# Patient Record
Sex: MALE | Race: WHITE | NOT HISPANIC OR LATINO | Employment: UNEMPLOYED | URBAN - METROPOLITAN AREA
[De-identification: names, ages, dates, MRNs, and addresses within clinical notes are randomized per-mention and may not be internally consistent; named-entity substitution may affect disease eponyms.]

---

## 2021-09-16 ENCOUNTER — HOSPITAL ENCOUNTER (EMERGENCY)
Facility: HOSPITAL | Age: 31
Discharge: HOME | End: 2021-09-17
Admitting: EMERGENCY MEDICINE

## 2021-09-16 ENCOUNTER — APPOINTMENT (OUTPATIENT)
Dept: GENERAL RADIOLOGY | Facility: HOSPITAL | Age: 31
End: 2021-09-16

## 2021-09-16 VITALS
DIASTOLIC BLOOD PRESSURE: 91 MMHG | RESPIRATION RATE: 16 BRPM | BODY MASS INDEX: 25.6 KG/M2 | HEIGHT: 69 IN | SYSTOLIC BLOOD PRESSURE: 151 MMHG | OXYGEN SATURATION: 100 % | HEART RATE: 101 BPM | TEMPERATURE: 98.7 F | WEIGHT: 172.84 LBS

## 2021-09-16 DIAGNOSIS — S02.2XXA CLOSED FRACTURE OF NASAL BONE, INITIAL ENCOUNTER: ICD-10-CM

## 2021-09-16 DIAGNOSIS — H61.23 BILATERAL IMPACTED CERUMEN: Primary | ICD-10-CM

## 2021-09-16 PROCEDURE — 99283 EMERGENCY DEPT VISIT LOW MDM: CPT

## 2021-09-16 PROCEDURE — 70160 X-RAY EXAM OF NASAL BONES: CPT

## 2021-09-16 RX ADMIN — CARBAMIDE PEROXIDE 6.5% 10 DROP: 6.5 LIQUID AURICULAR (OTIC) at 21:37
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
## 2022-04-22 ENCOUNTER — OFFICE VISIT (OUTPATIENT)
Dept: INTERNAL MEDICINE | Facility: CLINIC | Age: 32
End: 2022-04-22

## 2022-04-22 ENCOUNTER — LAB (OUTPATIENT)
Dept: LAB | Facility: HOSPITAL | Age: 32
End: 2022-04-22

## 2022-04-22 VITALS
BODY MASS INDEX: 27.7 KG/M2 | WEIGHT: 187 LBS | OXYGEN SATURATION: 99 % | RESPIRATION RATE: 20 BRPM | DIASTOLIC BLOOD PRESSURE: 60 MMHG | HEART RATE: 80 BPM | SYSTOLIC BLOOD PRESSURE: 104 MMHG | HEIGHT: 69 IN | TEMPERATURE: 97.6 F

## 2022-04-22 DIAGNOSIS — F41.9 ANXIETY: ICD-10-CM

## 2022-04-22 DIAGNOSIS — F33.1 MODERATE EPISODE OF RECURRENT MAJOR DEPRESSIVE DISORDER: ICD-10-CM

## 2022-04-22 DIAGNOSIS — K40.90 LEFT GROIN HERNIA: ICD-10-CM

## 2022-04-22 DIAGNOSIS — Z23 NEED FOR TDAP VACCINATION: ICD-10-CM

## 2022-04-22 DIAGNOSIS — F33.1 MODERATE EPISODE OF RECURRENT MAJOR DEPRESSIVE DISORDER: Primary | ICD-10-CM

## 2022-04-22 DIAGNOSIS — B19.20 HEPATITIS C VIRUS INFECTION WITHOUT HEPATIC COMA, UNSPECIFIED CHRONICITY: ICD-10-CM

## 2022-04-22 DIAGNOSIS — F19.10 SUBSTANCE ABUSE: ICD-10-CM

## 2022-04-22 PROCEDURE — 90715 TDAP VACCINE 7 YRS/> IM: CPT | Performed by: NURSE PRACTITIONER

## 2022-04-22 PROCEDURE — 90471 IMMUNIZATION ADMIN: CPT | Performed by: NURSE PRACTITIONER

## 2022-04-22 PROCEDURE — 99204 OFFICE O/P NEW MOD 45 MIN: CPT | Performed by: NURSE PRACTITIONER

## 2022-04-22 RX ORDER — QUETIAPINE FUMARATE 300 MG/1
TABLET, FILM COATED ORAL
COMMUNITY
Start: 2022-03-28 | End: 2022-04-25 | Stop reason: SDUPTHER

## 2022-04-22 RX ORDER — MIRTAZAPINE 30 MG/1
TABLET, FILM COATED ORAL
COMMUNITY
Start: 2022-03-28 | End: 2022-04-22

## 2022-04-22 RX ORDER — BUSPIRONE HYDROCHLORIDE 15 MG/1
TABLET ORAL
COMMUNITY
Start: 2022-03-28 | End: 2022-04-25 | Stop reason: SDUPTHER

## 2022-04-22 RX ORDER — BUPROPION HYDROCHLORIDE 150 MG/1
TABLET, EXTENDED RELEASE ORAL
COMMUNITY
Start: 2022-03-28 | End: 2022-04-22 | Stop reason: SDUPTHER

## 2022-04-22 RX ORDER — MULTIVIT/IRON SULF/FOLIC ACID 15MG-0.4MG
TABLET ORAL
COMMUNITY
Start: 2022-03-23 | End: 2022-04-25 | Stop reason: SDUPTHER

## 2022-04-22 RX ORDER — MIRTAZAPINE 45 MG/1
45 TABLET, FILM COATED ORAL NIGHTLY
Qty: 30 TABLET | Refills: 2 | Status: SHIPPED | OUTPATIENT
Start: 2022-04-22 | End: 2022-07-22

## 2022-04-22 RX ORDER — BUPRENORPHINE 300 MG/1
SOLUTION SUBCUTANEOUS
COMMUNITY
Start: 2022-03-25 | End: 2022-06-09

## 2022-04-22 RX ORDER — BUPROPION HYDROCHLORIDE 150 MG/1
TABLET, EXTENDED RELEASE ORAL
Qty: 90 TABLET | Refills: 2 | Status: SHIPPED | OUTPATIENT
Start: 2022-04-22 | End: 2022-07-11

## 2022-04-22 NOTE — PROGRESS NOTES
Subjective   José Luis Byrd is a 32 y.o. male    Chief Complaint   Patient presents with   • Establish Care   • Hernia     Lower abdomen, 5-6 yrs, painful on and off.    • Ear Fullness     Left ear, hospital was unable to retreive ear wax, states it's in really deep   • Insomnia     Takes remeron, discuss dosage change   • Depression     Continue to prescribe all meds     History of Present Illness     New pt here to establish care    H/O substance abuse, clean since 3/18/2022    Hernia - left groin, first noted about 5-6 years ago.  Has been evaluated in the ER's, but has not had this corrected due to insurance issues.      Ear fullness; left.  Was told he had a terrible cerumen impaction.  Hospital was not able to flush    Insomnia -chronic; currently on Remeron 30 mg nightly, along with Seroquel 300 mg.  Would like to increase the Remeron if possible.    Depression -chronic; current regimen is Wellbutrin  mg p.o. twice daily.  States that this is not working as well as it previously did.  Would like to increase dose if possible.  Denies suicidal/homicidal ideations.    Hep C - dx'd about 3 years ago, no tx    Past Medical History:   Diagnosis Date   • Heart murmur    • Infectious viral hepatitis     HEP C   • Substance abuse (HCC)     IV drugs and ETOH     Past Surgical History:   Procedure Laterality Date   • HAND SURGERY Right    • KNEE SURGERY Right      Family History   Problem Relation Age of Onset   • No Known Problems Mother    • No Known Problems Father    • Depression Sister    • Diabetes Maternal Aunt    • Cancer Maternal Grandfather      Social History     Socioeconomic History   • Marital status: Legally    Tobacco Use   • Smoking status: Never Smoker   • Smokeless tobacco: Current User     Types: Snuff   Vaping Use   • Vaping Use: Some days   • Substances: Nicotine   Substance and Sexual Activity   • Alcohol use: Not Currently   • Drug use: Yes     Types: Fentanyl, Methamphetamines,  Marijuana, Heroin     Comment: sober since 3/18/22   • Sexual activity: Defer     No Known Allergies      The following portions of the patient's history were reviewed and updated as appropriate: allergies, current medications, past family history, past medical history, past social history, past surgical history and problem list.    Current Outpatient Medications:   •  buPROPion SR (WELLBUTRIN SR) 150 MG 12 hr tablet, 300 mg QAM and 150 mg QPM, Disp: 90 tablet, Rfl: 2  •  busPIRone (BUSPAR) 15 MG tablet, , Disp: , Rfl:   •  Multiple Vitamins-Iron (multivitamin with iron) tablet tablet, , Disp: , Rfl:   •  QUEtiapine (SEROquel) 300 MG tablet, , Disp: , Rfl:   •  Sublocade 300 MG/1.5ML solution prefilled syringe, , Disp: , Rfl:   •  mirtazapine (REMERON) 45 MG tablet, Take 1 tablet by mouth Every Night., Disp: 30 tablet, Rfl: 2     Review of Systems   Constitutional: Negative for chills, fatigue and fever.   Respiratory: Negative for cough, chest tightness and shortness of breath.    Cardiovascular: Negative for chest pain.   Gastrointestinal: Negative for abdominal pain, diarrhea, nausea and vomiting.   Endocrine: Negative for cold intolerance and heat intolerance.   Musculoskeletal: Negative for arthralgias.   Neurological: Negative for dizziness.   Psychiatric/Behavioral: Positive for dysphoric mood and sleep disturbance.       Objective   Physical Exam  Constitutional:       Appearance: He is well-developed.   HENT:      Head: Normocephalic and atraumatic.   Eyes:      Conjunctiva/sclera: Conjunctivae normal.      Pupils: Pupils are equal, round, and reactive to light.   Cardiovascular:      Rate and Rhythm: Normal rate and regular rhythm.      Heart sounds: Normal heart sounds.   Pulmonary:      Effort: Pulmonary effort is normal.      Breath sounds: Normal breath sounds.   Abdominal:      General: Bowel sounds are normal.      Palpations: Abdomen is soft.      Hernia: A hernia is present. Hernia is present in  "the right inguinal area.   Musculoskeletal:         General: Normal range of motion.      Cervical back: Normal range of motion.   Skin:     General: Skin is warm and dry.   Neurological:      Mental Status: He is alert and oriented to person, place, and time.      Deep Tendon Reflexes: Reflexes are normal and symmetric.   Psychiatric:         Mood and Affect: Mood is depressed.         Behavior: Behavior normal.         Thought Content: Thought content normal.         Judgment: Judgment normal.       Vitals:    04/22/22 1030   BP: 104/60   Cuff Size: Adult   Pulse: 80   Resp: 20   Temp: 97.6 °F (36.4 °C)   TempSrc: Infrared   SpO2: 99%   Weight: 84.8 kg (187 lb)   Height: 174 cm (68.5\")         Assessment/Plan   Diagnoses and all orders for this visit:    1. Moderate episode of recurrent major depressive disorder (HCC) (Primary)  -     mirtazapine (REMERON) 45 MG tablet; Take 1 tablet by mouth Every Night.  Dispense: 30 tablet; Refill: 2  -     buPROPion SR (WELLBUTRIN SR) 150 MG 12 hr tablet; 300 mg QAM and 150 mg QPM  Dispense: 90 tablet; Refill: 2  -     Basic Metabolic Panel; Future  -     Hepatic Function Panel; Future  -     Hepatitis Panel, Acute; Future  -     CBC & Differential; Future  -     TSH Rfx On Abnormal To Free T4; Future    2. Anxiety  -     Basic Metabolic Panel; Future  -     Hepatic Function Panel; Future  -     Hepatitis Panel, Acute; Future  -     CBC & Differential; Future  -     TSH Rfx On Abnormal To Free T4; Future    3. Hepatitis C virus infection without hepatic coma, unspecified chronicity  -     Basic Metabolic Panel; Future  -     Hepatic Function Panel; Future  -     Hepatitis Panel, Acute; Future  -     CBC & Differential; Future  -     TSH Rfx On Abnormal To Free T4; Future    4. Substance abuse (HCC)  -     Basic Metabolic Panel; Future  -     Hepatic Function Panel; Future  -     Hepatitis Panel, Acute; Future  -     CBC & Differential; Future  -     TSH Rfx On Abnormal To Free " T4; Future    5. Left groin hernia  -     Ambulatory Referral to General Surgery  -      Nonvascular Extremity Limited; Future    6. Need for Tdap vaccination  -     Tdap Vaccine Greater Than or Equal To 6yo IM    We will check labs, may need referral to gastroenterology  Remeron increased to 45 mg nightly  Wellbutrin increased to 300 every morning and 150 nightly  Continue BuSpar as directed  Referred to general surgery for evaluation of left groin hernia  Tdap updated  Return in about 4 weeks (around 5/20/2022) for Annual, collect labs today.

## 2022-04-25 DIAGNOSIS — F33.1 MODERATE EPISODE OF RECURRENT MAJOR DEPRESSIVE DISORDER: ICD-10-CM

## 2022-04-25 NOTE — TELEPHONE ENCOUNTER
Caller: Carson Gonzaloaida    Relationship: Self    Best call back number: [phone]    Requested Prescriptions:   Requested Prescriptions     Pending Prescriptions Disp Refills   • QUEtiapine (SEROquel) 300 MG tablet     • busPIRone (BUSPAR) 15 MG tablet     • Multiple Vitamins-Iron (multivitamin with iron) tablet tablet          Pharmacy where request should be sent: [id number] - [facility] [address] AT [facility] [address] [facility] [phone] [facility] [phone] FX     Additional details provided by patient: PATIENT STATED HIS PRESCRIPTION WERE NOT CALLED IN FOR REFILL.    PATIENT STATED BUSPAR IS 15 MG TWICE A DAY, SEROQUEL 300 MG TWICE A DAY, AND MULTI VITAMINS ONE TIME A DAY IN THE MORNING.    Does the patient have less than a 3 day supply:  [x] Yes  [] No    Doris Moreland Rep   04/25/22 12:41 EDT

## 2022-04-26 RX ORDER — MULTIVIT/IRON SULF/FOLIC ACID 15MG-0.4MG
1 TABLET ORAL DAILY
Qty: 90 EACH | Refills: 3 | Status: SHIPPED | OUTPATIENT
Start: 2022-04-26

## 2022-04-26 RX ORDER — QUETIAPINE FUMARATE 300 MG/1
300 TABLET, FILM COATED ORAL 2 TIMES DAILY
Qty: 180 TABLET | Refills: 1 | Status: SHIPPED | OUTPATIENT
Start: 2022-04-26 | End: 2022-09-09

## 2022-04-26 RX ORDER — BUSPIRONE HYDROCHLORIDE 15 MG/1
15 TABLET ORAL 2 TIMES DAILY
Qty: 180 TABLET | Refills: 1 | Status: SHIPPED | OUTPATIENT
Start: 2022-04-26 | End: 2022-05-20 | Stop reason: SDUPTHER

## 2022-04-26 NOTE — TELEPHONE ENCOUNTER
Pt called in to check on the medications again as he is out and needs them asap     Pt stated he has been taking the seroquel  300mg in the am 300 at night     buspar 15mg in am and 15mg at night     Multivitamin 1 per day

## 2022-04-26 NOTE — TELEPHONE ENCOUNTER
He dosing in not documented on his seroquel.  Can you ck with him or the pharmacy to see how he is taking this?

## 2022-04-26 NOTE — TELEPHONE ENCOUNTER
Would like a phone call back as soon as that is called in for him stated we may leave a detailed vm for him if he is unable to answer

## 2022-05-06 ENCOUNTER — LAB (OUTPATIENT)
Dept: LAB | Facility: HOSPITAL | Age: 32
End: 2022-05-06

## 2022-05-06 DIAGNOSIS — F41.9 ANXIETY: ICD-10-CM

## 2022-05-06 DIAGNOSIS — F19.10 SUBSTANCE ABUSE: ICD-10-CM

## 2022-05-06 DIAGNOSIS — F33.1 MODERATE EPISODE OF RECURRENT MAJOR DEPRESSIVE DISORDER: ICD-10-CM

## 2022-05-06 DIAGNOSIS — B19.20 HEPATITIS C VIRUS INFECTION WITHOUT HEPATIC COMA, UNSPECIFIED CHRONICITY: ICD-10-CM

## 2022-05-06 LAB
ALBUMIN SERPL-MCNC: 4.4 G/DL (ref 3.5–5.2)
ALP SERPL-CCNC: 60 U/L (ref 39–117)
ALT SERPL W P-5'-P-CCNC: 23 U/L (ref 1–41)
ANION GAP SERPL CALCULATED.3IONS-SCNC: 13.7 MMOL/L (ref 5–15)
AST SERPL-CCNC: 22 U/L (ref 1–40)
BASOPHILS # BLD AUTO: 0.08 10*3/MM3 (ref 0–0.2)
BASOPHILS NFR BLD AUTO: 1.3 % (ref 0–1.5)
BILIRUB CONJ SERPL-MCNC: <0.2 MG/DL (ref 0–0.3)
BILIRUB INDIRECT SERPL-MCNC: NORMAL MG/DL
BILIRUB SERPL-MCNC: 0.2 MG/DL (ref 0–1.2)
BUN SERPL-MCNC: 14 MG/DL (ref 6–20)
BUN/CREAT SERPL: 14.7 (ref 7–25)
CALCIUM SPEC-SCNC: 9.3 MG/DL (ref 8.6–10.5)
CHLORIDE SERPL-SCNC: 101 MMOL/L (ref 98–107)
CO2 SERPL-SCNC: 25.3 MMOL/L (ref 22–29)
CREAT SERPL-MCNC: 0.95 MG/DL (ref 0.76–1.27)
DEPRECATED RDW RBC AUTO: 46.2 FL (ref 37–54)
EGFRCR SERPLBLD CKD-EPI 2021: 109.7 ML/MIN/1.73
EOSINOPHIL # BLD AUTO: 0.76 10*3/MM3 (ref 0–0.4)
EOSINOPHIL NFR BLD AUTO: 12.4 % (ref 0.3–6.2)
ERYTHROCYTE [DISTWIDTH] IN BLOOD BY AUTOMATED COUNT: 13.8 % (ref 12.3–15.4)
GLUCOSE SERPL-MCNC: 80 MG/DL (ref 65–99)
HAV IGM SERPL QL IA: ABNORMAL
HBV CORE IGM SERPL QL IA: ABNORMAL
HBV SURFACE AG SERPL QL IA: ABNORMAL
HCT VFR BLD AUTO: 42.8 % (ref 37.5–51)
HCV AB SER DONR QL: REACTIVE
HGB BLD-MCNC: 14.4 G/DL (ref 13–17.7)
IMM GRANULOCYTES # BLD AUTO: 0.01 10*3/MM3 (ref 0–0.05)
IMM GRANULOCYTES NFR BLD AUTO: 0.2 % (ref 0–0.5)
LYMPHOCYTES # BLD AUTO: 2.96 10*3/MM3 (ref 0.7–3.1)
LYMPHOCYTES NFR BLD AUTO: 48.2 % (ref 19.6–45.3)
MCH RBC QN AUTO: 30.8 PG (ref 26.6–33)
MCHC RBC AUTO-ENTMCNC: 33.6 G/DL (ref 31.5–35.7)
MCV RBC AUTO: 91.5 FL (ref 79–97)
MONOCYTES # BLD AUTO: 0.71 10*3/MM3 (ref 0.1–0.9)
MONOCYTES NFR BLD AUTO: 11.6 % (ref 5–12)
NEUTROPHILS NFR BLD AUTO: 1.62 10*3/MM3 (ref 1.7–7)
NEUTROPHILS NFR BLD AUTO: 26.3 % (ref 42.7–76)
NRBC BLD AUTO-RTO: 0 /100 WBC (ref 0–0.2)
PLATELET # BLD AUTO: 242 10*3/MM3 (ref 140–450)
PMV BLD AUTO: 11.4 FL (ref 6–12)
POTASSIUM SERPL-SCNC: 4 MMOL/L (ref 3.5–5.2)
PROT SERPL-MCNC: 7.4 G/DL (ref 6–8.5)
RBC # BLD AUTO: 4.68 10*6/MM3 (ref 4.14–5.8)
SODIUM SERPL-SCNC: 140 MMOL/L (ref 136–145)
TSH SERPL DL<=0.05 MIU/L-ACNC: 3.22 UIU/ML (ref 0.27–4.2)
WBC NRBC COR # BLD: 6.14 10*3/MM3 (ref 3.4–10.8)

## 2022-05-06 PROCEDURE — 80048 BASIC METABOLIC PNL TOTAL CA: CPT | Performed by: NURSE PRACTITIONER

## 2022-05-06 PROCEDURE — 85025 COMPLETE CBC W/AUTO DIFF WBC: CPT | Performed by: NURSE PRACTITIONER

## 2022-05-06 PROCEDURE — 84443 ASSAY THYROID STIM HORMONE: CPT | Performed by: NURSE PRACTITIONER

## 2022-05-06 PROCEDURE — 80074 ACUTE HEPATITIS PANEL: CPT | Performed by: NURSE PRACTITIONER

## 2022-05-06 PROCEDURE — 36415 COLL VENOUS BLD VENIPUNCTURE: CPT

## 2022-05-06 PROCEDURE — 80076 HEPATIC FUNCTION PANEL: CPT | Performed by: NURSE PRACTITIONER

## 2022-05-10 ENCOUNTER — TELEPHONE (OUTPATIENT)
Dept: INTERNAL MEDICINE | Facility: CLINIC | Age: 32
End: 2022-05-10

## 2022-05-14 ENCOUNTER — HOSPITAL ENCOUNTER (OUTPATIENT)
Dept: ULTRASOUND IMAGING | Facility: HOSPITAL | Age: 32
Discharge: HOME | End: 2022-05-14
Admitting: NURSE PRACTITIONER

## 2022-05-14 DIAGNOSIS — K40.90 LEFT GROIN HERNIA: ICD-10-CM

## 2022-05-14 PROCEDURE — 76705 ECHO EXAM OF ABDOMEN: CPT

## 2022-05-16 DIAGNOSIS — B19.20 HEPATITIS C TEST POSITIVE: Primary | ICD-10-CM

## 2022-05-16 NOTE — TELEPHONE ENCOUNTER
Please let patient know that his hep C test was positive.  I have referred him to gastroenterology.  His CBC was also slightly abnormal.  We will just recheck at his next visit with me.  All other labs within acceptable limits.

## 2022-05-20 ENCOUNTER — OFFICE VISIT (OUTPATIENT)
Dept: INTERNAL MEDICINE | Facility: CLINIC | Age: 32
End: 2022-05-20

## 2022-05-20 ENCOUNTER — LAB (OUTPATIENT)
Dept: LAB | Facility: HOSPITAL | Age: 32
End: 2022-05-20

## 2022-05-20 VITALS
DIASTOLIC BLOOD PRESSURE: 70 MMHG | OXYGEN SATURATION: 97 % | SYSTOLIC BLOOD PRESSURE: 108 MMHG | HEIGHT: 69 IN | HEART RATE: 79 BPM | RESPIRATION RATE: 18 BRPM | TEMPERATURE: 97 F | WEIGHT: 184 LBS | BODY MASS INDEX: 27.25 KG/M2

## 2022-05-20 DIAGNOSIS — B19.20 HEPATITIS C VIRUS INFECTION WITHOUT HEPATIC COMA, UNSPECIFIED CHRONICITY: ICD-10-CM

## 2022-05-20 DIAGNOSIS — E55.9 VITAMIN D DEFICIENCY: ICD-10-CM

## 2022-05-20 DIAGNOSIS — F33.1 MODERATE EPISODE OF RECURRENT MAJOR DEPRESSIVE DISORDER: ICD-10-CM

## 2022-05-20 DIAGNOSIS — K40.90 LEFT INGUINAL HERNIA: ICD-10-CM

## 2022-05-20 DIAGNOSIS — Z00.00 ANNUAL PHYSICAL EXAM: ICD-10-CM

## 2022-05-20 DIAGNOSIS — Z00.00 ANNUAL PHYSICAL EXAM: Primary | ICD-10-CM

## 2022-05-20 DIAGNOSIS — F41.9 ANXIETY: ICD-10-CM

## 2022-05-20 DIAGNOSIS — R79.89 ABNORMAL CBC: ICD-10-CM

## 2022-05-20 LAB
25(OH)D3 SERPL-MCNC: 21.2 NG/ML (ref 30–100)
BASOPHILS # BLD AUTO: 0.05 10*3/MM3 (ref 0–0.2)
BASOPHILS NFR BLD AUTO: 1 % (ref 0–1.5)
BILIRUB BLD-MCNC: NEGATIVE MG/DL
CHOLEST SERPL-MCNC: 150 MG/DL (ref 0–200)
CLARITY, POC: CLEAR
COLOR UR: YELLOW
DEPRECATED RDW RBC AUTO: 43.3 FL (ref 37–54)
EOSINOPHIL # BLD AUTO: 0.85 10*3/MM3 (ref 0–0.4)
EOSINOPHIL NFR BLD AUTO: 17.2 % (ref 0.3–6.2)
ERYTHROCYTE [DISTWIDTH] IN BLOOD BY AUTOMATED COUNT: 13.3 % (ref 12.3–15.4)
EXPIRATION DATE: NORMAL
FERRITIN SERPL-MCNC: 62.8 NG/ML (ref 30–400)
FOLATE SERPL-MCNC: >20 NG/ML (ref 4.78–24.2)
GLUCOSE UR STRIP-MCNC: NEGATIVE MG/DL
HCT VFR BLD AUTO: 41.7 % (ref 37.5–51)
HGB BLD-MCNC: 14.4 G/DL (ref 13–17.7)
IMM GRANULOCYTES # BLD AUTO: 0.01 10*3/MM3 (ref 0–0.05)
IMM GRANULOCYTES NFR BLD AUTO: 0.2 % (ref 0–0.5)
IRON 24H UR-MRATE: 121 MCG/DL (ref 59–158)
IRON SATN MFR SERPL: 31 % (ref 20–50)
KETONES UR QL: NEGATIVE
LEUKOCYTE EST, POC: NEGATIVE
LYMPHOCYTES # BLD AUTO: 1.91 10*3/MM3 (ref 0.7–3.1)
LYMPHOCYTES NFR BLD AUTO: 38.7 % (ref 19.6–45.3)
Lab: NORMAL
MCH RBC QN AUTO: 31.2 PG (ref 26.6–33)
MCHC RBC AUTO-ENTMCNC: 34.5 G/DL (ref 31.5–35.7)
MCV RBC AUTO: 90.5 FL (ref 79–97)
MONOCYTES # BLD AUTO: 0.57 10*3/MM3 (ref 0.1–0.9)
MONOCYTES NFR BLD AUTO: 11.6 % (ref 5–12)
NEUTROPHILS NFR BLD AUTO: 1.54 10*3/MM3 (ref 1.7–7)
NEUTROPHILS NFR BLD AUTO: 31.3 % (ref 42.7–76)
NITRITE UR-MCNC: NEGATIVE MG/ML
NRBC BLD AUTO-RTO: 0 /100 WBC (ref 0–0.2)
PH UR: 6.5 [PH] (ref 5–8)
PLATELET # BLD AUTO: 250 10*3/MM3 (ref 140–450)
PMV BLD AUTO: 11 FL (ref 6–12)
PROT UR STRIP-MCNC: NEGATIVE MG/DL
RBC # BLD AUTO: 4.61 10*6/MM3 (ref 4.14–5.8)
RBC # UR STRIP: NEGATIVE /UL
SP GR UR: 1.01 (ref 1–1.03)
TIBC SERPL-MCNC: 392 MCG/DL (ref 298–536)
TRANSFERRIN SERPL-MCNC: 263 MG/DL (ref 200–360)
TSH SERPL DL<=0.05 MIU/L-ACNC: 1.95 UIU/ML (ref 0.27–4.2)
UROBILINOGEN UR QL: NORMAL
VIT B12 BLD-MCNC: 468 PG/ML (ref 211–946)
WBC NRBC COR # BLD: 4.93 10*3/MM3 (ref 3.4–10.8)

## 2022-05-20 PROCEDURE — 82306 VITAMIN D 25 HYDROXY: CPT | Performed by: NURSE PRACTITIONER

## 2022-05-20 PROCEDURE — 82746 ASSAY OF FOLIC ACID SERUM: CPT | Performed by: NURSE PRACTITIONER

## 2022-05-20 PROCEDURE — 82607 VITAMIN B-12: CPT | Performed by: NURSE PRACTITIONER

## 2022-05-20 PROCEDURE — 84443 ASSAY THYROID STIM HORMONE: CPT | Performed by: NURSE PRACTITIONER

## 2022-05-20 PROCEDURE — 2014F MENTAL STATUS ASSESS: CPT | Performed by: NURSE PRACTITIONER

## 2022-05-20 PROCEDURE — 81003 URINALYSIS AUTO W/O SCOPE: CPT | Performed by: NURSE PRACTITIONER

## 2022-05-20 PROCEDURE — 3008F BODY MASS INDEX DOCD: CPT | Performed by: NURSE PRACTITIONER

## 2022-05-20 PROCEDURE — 85025 COMPLETE CBC W/AUTO DIFF WBC: CPT | Performed by: NURSE PRACTITIONER

## 2022-05-20 PROCEDURE — 82270 OCCULT BLOOD FECES: CPT | Performed by: NURSE PRACTITIONER

## 2022-05-20 PROCEDURE — 83540 ASSAY OF IRON: CPT | Performed by: NURSE PRACTITIONER

## 2022-05-20 PROCEDURE — 84466 ASSAY OF TRANSFERRIN: CPT | Performed by: NURSE PRACTITIONER

## 2022-05-20 PROCEDURE — 99395 PREV VISIT EST AGE 18-39: CPT | Performed by: NURSE PRACTITIONER

## 2022-05-20 PROCEDURE — 82728 ASSAY OF FERRITIN: CPT | Performed by: NURSE PRACTITIONER

## 2022-05-20 PROCEDURE — 82465 ASSAY BLD/SERUM CHOLESTEROL: CPT | Performed by: NURSE PRACTITIONER

## 2022-05-20 RX ORDER — BUSPIRONE HYDROCHLORIDE 15 MG/1
15 TABLET ORAL 3 TIMES DAILY
Qty: 270 TABLET | Refills: 1 | Status: SHIPPED | OUTPATIENT
Start: 2022-05-20 | End: 2023-01-19

## 2022-05-20 NOTE — PROGRESS NOTES
Subjective   José Luis Byrd is a 32 y.o. male    Chief Complaint   Patient presents with   • Annual Exam   • Anxiety     Pt asking if he could take 3 tablets of the buspar a day, doing good with 2 but sometimes his anxiety level gets too high sometimes and wanted to know if he could increase     History of Present Illness     H/O substance abuse, clean since 3/18/2022.  He is living in a sober living home     Hernia - left groin, first noted about 5-6 years ago.  Has been evaluated in the ER's, but has not had this corrected due to insurance issues.  Was sent for US at last visit  IMPRESSION:  1.  Slight heterogeneity and irregularity within the soft tissues in the  left suprapubic area where patient is symptomatic. Findings may relate  to hernia. CT could be of benefit to further assess the anatomy.     Insomnia -chronic; currently on Remeron 45 mg nightly (increased at last visit), along with Seroquel 300 mg.       Depression/anxiety -chronic; current regimen is Wellbutrin  QAM and 150 mg p.o. QHS.  Also taking Buspar 15 mg BID.  Feeling more anxious and would like to increase his Buspar to TID dosing     Hep C - dx'd about 3 years ago, no tx.  Has been referred to GI.      COVID - declines  Flu shot - declines  Tdap - 4/22/22  Hep C screen - 4/2022    Diet - could use work on healthier choices    Exercise - not currently     Social History     Tobacco Use   • Smoking status: Never Smoker   • Smokeless tobacco: Current User     Types: Snuff   Vaping Use   • Vaping Use: Some days   • Substances: Nicotine   Substance Use Topics   • Alcohol use: Not Currently   • Drug use: Yes     Types: Fentanyl, Methamphetamines, Marijuana, Heroin     Comment: sober since 3/18/22       The following portions of the patient's history were reviewed and updated as appropriate: allergies, current medications, past family history, past medical history, past social history, past surgical history and problem list.    Current  Outpatient Medications:   •  buPROPion SR (WELLBUTRIN SR) 150 MG 12 hr tablet, 300 mg QAM and 150 mg QPM, Disp: 90 tablet, Rfl: 2  •  busPIRone (BUSPAR) 15 MG tablet, Take 1 tablet by mouth 3 (Three) Times a Day., Disp: 270 tablet, Rfl: 1  •  mirtazapine (REMERON) 45 MG tablet, Take 1 tablet by mouth Every Night., Disp: 30 tablet, Rfl: 2  •  Multiple Vitamins-Iron (multivitamin with iron) tablet tablet, Take 1 tablet by mouth Daily., Disp: 90 each, Rfl: 3  •  ondansetron ODT (ZOFRAN-ODT) 4 MG disintegrating tablet, Place 1 tablet on the tongue Every 8 (Eight) Hours As Needed for Nausea., Disp: 20 tablet, Rfl: 0  •  QUEtiapine (SEROquel) 300 MG tablet, Take 1 tablet by mouth 2 (Two) Times a Day., Disp: 180 tablet, Rfl: 1  •  Sublocade 300 MG/1.5ML solution prefilled syringe, , Disp: , Rfl:      Review of Systems   Constitutional: Negative for appetite change, chills, fatigue, fever and unexpected weight change.   HENT: Negative for congestion, ear pain, nosebleeds, rhinorrhea and tinnitus.    Eyes: Negative for pain.   Respiratory: Negative for cough, chest tightness and shortness of breath.    Cardiovascular: Negative for chest pain, palpitations and leg swelling.   Gastrointestinal: Negative for abdominal distention, abdominal pain, blood in stool, constipation, diarrhea, nausea and vomiting.   Endocrine: Negative for cold intolerance, heat intolerance, polydipsia, polyphagia and polyuria.   Genitourinary: Negative for dysuria, flank pain, frequency, hematuria and urgency.   Musculoskeletal: Negative for arthralgias, back pain, gait problem, joint swelling, myalgias and neck pain.   Skin: Negative for color change, pallor, rash and wound.   Allergic/Immunologic: Negative for environmental allergies and food allergies.   Neurological: Negative for dizziness, syncope, weakness, light-headedness, numbness and headaches.   Hematological: Negative for adenopathy. Does not bruise/bleed easily.   Psychiatric/Behavioral:  "Negative for behavioral problems and suicidal ideas. The patient is nervous/anxious.        Objective   Physical Exam  Constitutional:       Appearance: He is well-developed and normal weight.   HENT:      Head: Normocephalic and atraumatic.      Right Ear: External ear normal.      Left Ear: External ear normal.      Nose: Nose normal.      Mouth/Throat:      Mouth: Mucous membranes are moist.      Pharynx: Oropharynx is clear.   Eyes:      General: Lids are normal.      Conjunctiva/sclera: Conjunctivae normal.      Pupils: Pupils are equal, round, and reactive to light.   Neck:      Vascular: No carotid bruit.   Cardiovascular:      Rate and Rhythm: Normal rate and regular rhythm.      Pulses: Normal pulses.      Heart sounds: Normal heart sounds. No murmur heard.  Pulmonary:      Effort: Pulmonary effort is normal.      Breath sounds: Normal breath sounds.   Abdominal:      General: Bowel sounds are normal.      Palpations: Abdomen is soft. There is no hepatomegaly or splenomegaly.      Hernia: No hernia is present.   Genitourinary:     Penis: Normal.       Prostate: Normal.      Rectum: Normal. Guaiac result negative.   Musculoskeletal:         General: Normal range of motion.      Cervical back: Normal range of motion and neck supple.   Lymphadenopathy:      Cervical: No cervical adenopathy.   Skin:     General: Skin is warm and dry.      Capillary Refill: Capillary refill takes less than 2 seconds.   Neurological:      Mental Status: He is alert and oriented to person, place, and time.      Deep Tendon Reflexes: Reflexes are normal and symmetric.   Psychiatric:         Mood and Affect: Mood is anxious.         Behavior: Behavior normal.         Thought Content: Thought content normal.         Judgment: Judgment normal.       Vitals:    05/20/22 1026   BP: 108/70   Cuff Size: Adult   Pulse: 79   Resp: 18   Temp: 97 °F (36.1 °C)   TempSrc: Infrared   SpO2: 97%   Weight: 83.5 kg (184 lb)   Height: 174 cm (68.5\") "         Assessment & Plan   Diagnoses and all orders for this visit:    1. Annual physical exam (Primary)  -     POCT urinalysis dipstick, automated  -     TSH Rfx On Abnormal To Free T4; Future  -     Vitamin B12; Future  -     Vitamin D 25 Hydroxy; Future  -     CBC & Differential; Future  -     Iron Profile; Future  -     Ferritin; Future  -     Folate; Future  -     Cholesterol, Total; Future    2. Left inguinal hernia  -     Ambulatory Referral to General Surgery    3. Hepatitis C virus infection without hepatic coma, unspecified chronicity  -     TSH Rfx On Abnormal To Free T4; Future  -     Vitamin B12; Future  -     Vitamin D 25 Hydroxy; Future  -     CBC & Differential; Future  -     Iron Profile; Future  -     Ferritin; Future  -     Folate; Future  -     Cholesterol, Total; Future    4. Anxiety  -     busPIRone (BUSPAR) 15 MG tablet; Take 1 tablet by mouth 3 (Three) Times a Day.  Dispense: 270 tablet; Refill: 1  -     TSH Rfx On Abnormal To Free T4; Future  -     Vitamin B12; Future  -     Vitamin D 25 Hydroxy; Future  -     CBC & Differential; Future  -     Iron Profile; Future  -     Ferritin; Future  -     Folate; Future  -     Cholesterol, Total; Future    5. Moderate episode of recurrent major depressive disorder (HCC)  -     TSH Rfx On Abnormal To Free T4; Future  -     Vitamin B12; Future  -     Vitamin D 25 Hydroxy; Future  -     CBC & Differential; Future  -     Iron Profile; Future  -     Ferritin; Future  -     Folate; Future  -     Cholesterol, Total; Future    6. Abnormal CBC  -     TSH Rfx On Abnormal To Free T4; Future  -     Vitamin B12; Future  -     Vitamin D 25 Hydroxy; Future  -     CBC & Differential; Future  -     Iron Profile; Future  -     Ferritin; Future  -     Folate; Future  -     Cholesterol, Total; Future    7. Vitamin D deficiency  -     TSH Rfx On Abnormal To Free T4; Future  -     Vitamin B12; Future  -     Vitamin D 25 Hydroxy; Future  -     CBC & Differential; Future  -      Iron Profile; Future  -     Ferritin; Future  -     Folate; Future  -     Cholesterol, Total; Future    Labs sent  BuSpar increased to 3 times daily dosing  No other medication changes made today  No refills needed today  Counseling, diet and exercise, staying sober  Referred to general surgery for evaluation of left groin hernia  Keep appointment as scheduled with gastroenterology for evaluation of hep C  Return in about 6 months (around 11/20/2022) for f/u, collect labs today.

## 2022-06-08 NOTE — PROGRESS NOTES
Patient: José Luis Byrd    YOB: 1990    Date: 06/09/2022    Primary Care Provider: Elba Ricks APRN    Chief Complaint   Patient presents with   • Mass     LIH       SUBJECTIVE:    History of present illness: Patient with a 5 to 6-year history of left inguinal bulge.  He states that standing makes it worse.  Some localized pain.  No urinary or bowel issues.    The following portions of the patient's history were reviewed and updated as appropriate: allergies, current medications, past family history, past medical history, past social history, past surgical history and problem list.    Review of Systems   Constitutional: Negative for activity change, chills, fever and unexpected weight change.   HENT: Negative for hearing loss, trouble swallowing and voice change.    Eyes: Negative for visual disturbance.   Respiratory: Negative for apnea, cough, chest tightness, shortness of breath and wheezing.    Cardiovascular: Negative for chest pain, palpitations and leg swelling.   Gastrointestinal: Positive for abdominal distention and abdominal pain. Negative for anal bleeding, blood in stool, constipation, diarrhea, nausea, rectal pain and vomiting.   Endocrine: Negative for cold intolerance and heat intolerance.   Genitourinary: Negative for difficulty urinating, dysuria and flank pain.   Musculoskeletal: Negative for back pain and gait problem.   Skin: Negative for color change, rash and wound.   Neurological: Negative for dizziness, syncope, speech difficulty, weakness, light-headedness, numbness and headaches.   Hematological: Negative for adenopathy. Does not bruise/bleed easily.   Psychiatric/Behavioral: Negative for confusion. The patient is not nervous/anxious.        History:  Past Medical History:   Diagnosis Date   • Heart murmur    • Infectious viral hepatitis     HEP C   • Substance abuse (HCC)     IV drugs and ETOH       Past Surgical History:   Procedure Laterality Date   • HAND  "SURGERY Right    • KNEE SURGERY Right        Family History   Problem Relation Age of Onset   • No Known Problems Mother    • No Known Problems Father    • Depression Sister    • Diabetes Maternal Aunt    • Cancer Maternal Grandfather        Social History     Tobacco Use   • Smoking status: Never Smoker   • Smokeless tobacco: Current User     Types: Snuff   Vaping Use   • Vaping Use: Some days   • Substances: Nicotine   Substance Use Topics   • Alcohol use: Not Currently   • Drug use: Yes     Types: Fentanyl, Methamphetamines, Marijuana, Heroin     Comment: sober since 3/18/22       Allergies:  No Known Allergies    Medications:    Current Outpatient Medications:   •  buPROPion SR (WELLBUTRIN SR) 150 MG 12 hr tablet, 300 mg QAM and 150 mg QPM, Disp: 90 tablet, Rfl: 2  •  busPIRone (BUSPAR) 15 MG tablet, Take 1 tablet by mouth 3 (Three) Times a Day., Disp: 270 tablet, Rfl: 1  •  mirtazapine (REMERON) 45 MG tablet, Take 1 tablet by mouth Every Night., Disp: 30 tablet, Rfl: 2  •  Multiple Vitamins-Iron (multivitamin with iron) tablet tablet, Take 1 tablet by mouth Daily., Disp: 90 each, Rfl: 3  •  multivitamin with minerals tablet tablet, Take 1 tablet by mouth Daily., Disp: , Rfl:   •  QUEtiapine (SEROquel) 300 MG tablet, Take 1 tablet by mouth 2 (Two) Times a Day., Disp: 180 tablet, Rfl: 1  •  Sublocade 100 MG/0.5ML solution prefilled syringe, , Disp: , Rfl:     OBJECTIVE:    Vital Signs:   Vitals:    06/09/22 1349   BP: 130/78   Pulse: 94   Temp: 98 °F (36.7 °C)   TempSrc: Temporal   SpO2: 99%   Weight: 87.2 kg (192 lb 3.2 oz)   Height: 175.3 cm (69\")       Physical Exam:   General Appearance:    Alert, cooperative, in no acute distress   Head:    Normocephalic, without obvious abnormality, atraumatic   Eyes:            Normal.  No scleral icterus.  PERRLA    Lungs:     Clear to auscultation,respirations regular, even and                  unlabored    Heart:    Regular rhythm and normal rate, normal S1 and S2, no   "          murmur   Abdomen:     Normal bowel sounds, no masses, no organomegaly, soft        non-tender, non-distended, no guarding, reducible left inguinal hernia.  No hernia on the right side.   Extremities:   Moves all extremities well, no edema, no cyanosis, no             redness   Skin:   No bleeding, bruising or rash   Neurologic:   Normal without gross deficits.   Psychiatric: No evidence of depression or anxiety          Results Review:   None    Review of Systems was reviewed and confirmed as accurate as documented by the MA.    ASSESSMENT/PLAN:    1. Non-recurrent unilateral inguinal hernia without obstruction or gangrene        Patient with a left inguinal hernia that is increasing in size.  I explained to him that he has the option of repair as well as observation.  With observation this will continue to worsen with time and is at risk for intestinal strangulation/obstruction.  I recommend a left inguinal hernia repair.  I had a detailed and extensive discussion with the patient in the office and they understand that they need to undergo hernia repair with mesh.  Full risks and benefits of operative versus nonoperative intervention were discussed with the patient and these included things such as nonresolution of symptoms and possible worsening of symptoms without surgical intervention versus infection, bleeding, possible recurrent hernia, possible postoperative neuralgia from nerve damage or involvement with scar tissue, etc. he understands all of the above and wishes to have his hernia repaired         Electronically signed by Edison Aj MD  06/09/22

## 2022-06-09 ENCOUNTER — OFFICE VISIT (OUTPATIENT)
Dept: SURGERY | Facility: CLINIC | Age: 32
End: 2022-06-09

## 2022-06-09 VITALS
HEART RATE: 94 BPM | BODY MASS INDEX: 28.47 KG/M2 | OXYGEN SATURATION: 99 % | TEMPERATURE: 98 F | WEIGHT: 192.2 LBS | DIASTOLIC BLOOD PRESSURE: 78 MMHG | HEIGHT: 69 IN | SYSTOLIC BLOOD PRESSURE: 130 MMHG

## 2022-06-09 DIAGNOSIS — K40.90 NON-RECURRENT UNILATERAL INGUINAL HERNIA WITHOUT OBSTRUCTION OR GANGRENE: Primary | ICD-10-CM

## 2022-06-09 PROCEDURE — 99204 OFFICE O/P NEW MOD 45 MIN: CPT | Performed by: SURGERY

## 2022-06-09 RX ORDER — BUPRENORPHINE 100 MG/1
SOLUTION SUBCUTANEOUS
COMMUNITY
Start: 2022-05-20 | End: 2022-07-12

## 2022-06-09 RX ORDER — MULTIVITAMIN WITH FOLIC ACID 400 MCG
1 TABLET ORAL DAILY
COMMUNITY
Start: 2022-05-25 | End: 2022-06-27

## 2022-06-10 ENCOUNTER — PATIENT ROUNDING (BHMG ONLY) (OUTPATIENT)
Dept: SURGERY | Facility: CLINIC | Age: 32
End: 2022-06-10

## 2022-06-10 NOTE — PROGRESS NOTES
June 09, 2022    Hello, may I speak with José Luis Byrd?    My name is ELVA HARRISON     I am  with [facility] GENERAL SURGERY  [address]  [place] [address] [phone].    Before we get started may I verify your date of birth? 1990    I am calling to officially welcome you to our practice and ask about your recent visit. Is this a good time to talk? yes    Tell me about your visit with us. What things went well?  EVERYTHING WAS GOOD.       We're always looking for ways to make our patients' experiences even better. Do you have recommendations on ways we may improve?  no    Overall were you satisfied with your first visit to our practice? yes       I appreciate you taking the time to speak with me today. Is there anything else I can do for you? no      Thank you, and have a great day.

## 2022-06-14 DIAGNOSIS — Z01.818 PREOPERATIVE CLEARANCE: Primary | ICD-10-CM

## 2022-06-24 ENCOUNTER — TELEPHONE (OUTPATIENT)
Dept: SURGERY | Facility: CLINIC | Age: 32
End: 2022-06-24

## 2022-06-24 ENCOUNTER — TELEPHONE (OUTPATIENT)
Dept: PREADMISSION TESTING | Facility: HOSPITAL | Age: 32
End: 2022-06-24

## 2022-06-24 NOTE — TELEPHONE ENCOUNTER
M FOR PATIENT TO CALL AND CONFIRM THE PROCEDURE ON 06/29/2022 @ [facility].

## 2022-06-27 ENCOUNTER — PRE-ADMISSION TESTING (OUTPATIENT)
Dept: PREADMISSION TESTING | Facility: HOSPITAL | Age: 32
End: 2022-06-27

## 2022-06-27 VITALS — BODY MASS INDEX: 27.99 KG/M2 | HEIGHT: 69 IN | WEIGHT: 189 LBS

## 2022-06-27 DIAGNOSIS — R79.89 ABNORMAL LFTS: Primary | ICD-10-CM

## 2022-06-27 DIAGNOSIS — Z01.818 PREOP TESTING: Primary | ICD-10-CM

## 2022-06-27 LAB
ALBUMIN SERPL-MCNC: 5 G/DL (ref 3.5–5.2)
ALBUMIN/GLOB SERPL: 1.7 G/DL
ALP SERPL-CCNC: 91 U/L (ref 39–117)
ALT SERPL W P-5'-P-CCNC: 109 U/L (ref 1–41)
ANION GAP SERPL CALCULATED.3IONS-SCNC: 13.8 MMOL/L (ref 5–15)
AST SERPL-CCNC: 53 U/L (ref 1–40)
BILIRUB SERPL-MCNC: 0.3 MG/DL (ref 0–1.2)
BUN SERPL-MCNC: 17 MG/DL (ref 6–20)
BUN/CREAT SERPL: 17.7 (ref 7–25)
CALCIUM SPEC-SCNC: 9.3 MG/DL (ref 8.6–10.5)
CHLORIDE SERPL-SCNC: 100 MMOL/L (ref 98–107)
CO2 SERPL-SCNC: 24.2 MMOL/L (ref 22–29)
CREAT SERPL-MCNC: 0.96 MG/DL (ref 0.76–1.27)
DEPRECATED RDW RBC AUTO: 39.3 FL (ref 37–54)
EGFRCR SERPLBLD CKD-EPI 2021: 108.4 ML/MIN/1.73
ERYTHROCYTE [DISTWIDTH] IN BLOOD BY AUTOMATED COUNT: 12 % (ref 12.3–15.4)
GLOBULIN UR ELPH-MCNC: 2.9 GM/DL
GLUCOSE SERPL-MCNC: 89 MG/DL (ref 65–99)
HCT VFR BLD AUTO: 44.8 % (ref 37.5–51)
HGB BLD-MCNC: 15.9 G/DL (ref 13–17.7)
MCH RBC QN AUTO: 31.4 PG (ref 26.6–33)
MCHC RBC AUTO-ENTMCNC: 35.5 G/DL (ref 31.5–35.7)
MCV RBC AUTO: 88.5 FL (ref 79–97)
PLATELET # BLD AUTO: 266 10*3/MM3 (ref 140–450)
PMV BLD AUTO: 10.6 FL (ref 6–12)
POTASSIUM SERPL-SCNC: 4.4 MMOL/L (ref 3.5–5.2)
PROT SERPL-MCNC: 7.9 G/DL (ref 6–8.5)
RBC # BLD AUTO: 5.06 10*6/MM3 (ref 4.14–5.8)
SODIUM SERPL-SCNC: 138 MMOL/L (ref 136–145)
WBC NRBC COR # BLD: 5.98 10*3/MM3 (ref 3.4–10.8)

## 2022-06-27 PROCEDURE — 80053 COMPREHEN METABOLIC PANEL: CPT

## 2022-06-27 PROCEDURE — U0004 COV-19 TEST NON-CDC HGH THRU: HCPCS

## 2022-06-27 PROCEDURE — C9803 HOPD COVID-19 SPEC COLLECT: HCPCS

## 2022-06-27 PROCEDURE — 85027 COMPLETE CBC AUTOMATED: CPT

## 2022-06-27 PROCEDURE — 36415 COLL VENOUS BLD VENIPUNCTURE: CPT

## 2022-06-27 NOTE — DISCHARGE INSTRUCTIONS
PAT PASS GIVEN/REVIEWED WITH PT.  VERBALIZED UNDERSTANDING OF THE FOLLOWING:  DO NOT EAT, DRINK, SMOKE, USE SMOKELESS TOBACCO OR CHEW GUM AFTER MIDNIGHT THE NIGHT BEFORE SURGERY.  THIS ALSO INCLUDES HARD CANDIES AND MINTS.    DO NOT SHAVE THE AREA TO BE OPERATED ON AT LEAST 48 HOURS PRIOR TO THE PROCEDURE.  DO NOT WEAR MAKE UP OR NAIL POLISH.  DO NOT LEAVE IN ANY PIERCING OR WEAR JEWELRY THE DAY OF SURGERY.      DO NOT USE ADHESIVES IF YOU WEAR DENTURES.    DO NOT WEAR EYE CONTACTS; BRING IN YOUR GLASSES.    ONLY TAKE MEDICATION THE MORNING OF YOUR PROCEDURE IF INSTRUCTED BY YOUR SURGEON WITH ENOUGH WATER TO SWALLOW THE MEDICATION.  IF YOUR SURGEON DID NOT SPECIFY WHICH MEDICATIONS TO TAKE, YOU WILL NEED TO CALL THEIR OFFICE FOR FURTHER INSTRUCTIONS AND DO AS THEY INSTRUCT.    LEAVE ANYTHING YOU CONSIDER VALUABLE AT HOME.    YOU WILL NEED TO ARRANGE FOR SOMEONE TO DRIVE YOU HOME AFTER SURGERY.  IT IS RECOMMENDED THAT YOU DO NOT DRIVE, WORK, DRINK ALCOHOL OR MAKE MAJOR DECISIONS FOR AT LEAST 24 HOURS AFTER YOUR PROCEDURE IS COMPLETE.      THE DAY OF YOUR PROCEDURE, BRING IN THE FOLLOWING IF APPLICABLE:   PICTURE ID AND INSURANCE/MEDICARE OR MEDICAID CARDS/ANY CO-PAY THAT MAY BE DUE   COPY OF ADVANCED DIRECTIVE/LIVING WILL/POWER OR    CPAP/BIPAP/INHALERS   SKIN PREP SHEET   YOUR PREADMISSION TESTING PASS (IF NOT A PHONE HISTORY)       Chlorhexidine wipes along with instruction/verification sheet given to pt.  Instructed pt to date, time, and initial the verification sheet once skin prep has been  completed, and to [place] [facility] the day of the procedure.  Pt. Verbalizes understanding.       COVID self-quarantine instructions reviewed with the pt.  Verbalized understanding.

## 2022-06-28 LAB — SARS-COV-2 RNA NOSE QL NAA+PROBE: NOT DETECTED

## 2022-06-29 ENCOUNTER — ANESTHESIA EVENT (OUTPATIENT)
Dept: PERIOP | Facility: HOSPITAL | Age: 32
End: 2022-06-29

## 2022-06-29 ENCOUNTER — HOSPITAL ENCOUNTER (OUTPATIENT)
Facility: HOSPITAL | Age: 32
Setting detail: HOSPITAL OUTPATIENT SURGERY
Discharge: HOME | End: 2022-06-29
Attending: SURGERY | Admitting: SURGERY

## 2022-06-29 ENCOUNTER — ANESTHESIA (OUTPATIENT)
Dept: PERIOP | Facility: HOSPITAL | Age: 32
End: 2022-06-29

## 2022-06-29 VITALS
RESPIRATION RATE: 16 BRPM | HEART RATE: 78 BPM | OXYGEN SATURATION: 98 % | DIASTOLIC BLOOD PRESSURE: 86 MMHG | SYSTOLIC BLOOD PRESSURE: 136 MMHG | TEMPERATURE: 97.8 F

## 2022-06-29 DIAGNOSIS — K40.90 NON-RECURRENT UNILATERAL INGUINAL HERNIA WITHOUT OBSTRUCTION OR GANGRENE: ICD-10-CM

## 2022-06-29 PROCEDURE — 25010000002 HYDROMORPHONE PER 4 MG: Performed by: NURSE ANESTHETIST, CERTIFIED REGISTERED

## 2022-06-29 PROCEDURE — 25010000002 KETOROLAC TROMETHAMINE PER 15 MG: Performed by: NURSE ANESTHETIST, CERTIFIED REGISTERED

## 2022-06-29 PROCEDURE — C1781 MESH (IMPLANTABLE): HCPCS | Performed by: SURGERY

## 2022-06-29 PROCEDURE — 25010000002 PROPOFOL 200 MG/20ML EMULSION: Performed by: NURSE ANESTHETIST, CERTIFIED REGISTERED

## 2022-06-29 PROCEDURE — 49505 PRP I/HERN INIT REDUC >5 YR: CPT | Performed by: SURGERY

## 2022-06-29 PROCEDURE — 0 LIDOCAINE 1 % SOLUTION 20 ML VIAL: Performed by: SURGERY

## 2022-06-29 PROCEDURE — 0 MEPERIDINE PER 100 MG: Performed by: NURSE ANESTHETIST, CERTIFIED REGISTERED

## 2022-06-29 PROCEDURE — 25010000002 DEXAMETHASONE PER 1 MG: Performed by: NURSE ANESTHETIST, CERTIFIED REGISTERED

## 2022-06-29 PROCEDURE — 25010000002 MIDAZOLAM PER 1MG: Performed by: NURSE ANESTHETIST, CERTIFIED REGISTERED

## 2022-06-29 PROCEDURE — 0 CEFAZOLIN SODIUM-DEXTROSE 2-3 GM-%(50ML) RECONSTITUTED SOLUTION: Performed by: SURGERY

## 2022-06-29 PROCEDURE — 25010000002 ONDANSETRON PER 1 MG: Performed by: NURSE ANESTHETIST, CERTIFIED REGISTERED

## 2022-06-29 DEVICE — PHASIX PLUG AND PATCH EXTRA LARGE
Type: IMPLANTABLE DEVICE | Site: GROIN | Status: FUNCTIONAL
Brand: PHASIX PLUG AND PATCH

## 2022-06-29 RX ORDER — CEFAZOLIN SODIUM 2 G/50ML
2 SOLUTION INTRAVENOUS ONCE
Status: COMPLETED | OUTPATIENT
Start: 2022-06-29 | End: 2022-06-29

## 2022-06-29 RX ORDER — KETOROLAC TROMETHAMINE 30 MG/ML
INJECTION, SOLUTION INTRAMUSCULAR; INTRAVENOUS AS NEEDED
Status: DISCONTINUED | OUTPATIENT
Start: 2022-06-29 | End: 2022-06-29 | Stop reason: SURG

## 2022-06-29 RX ORDER — DEXAMETHASONE SODIUM PHOSPHATE 4 MG/ML
INJECTION, SOLUTION INTRA-ARTICULAR; INTRALESIONAL; INTRAMUSCULAR; INTRAVENOUS; SOFT TISSUE AS NEEDED
Status: DISCONTINUED | OUTPATIENT
Start: 2022-06-29 | End: 2022-06-29 | Stop reason: SURG

## 2022-06-29 RX ORDER — KETAMINE HCL IN NACL, ISO-OSM 100MG/10ML
SYRINGE (ML) INJECTION AS NEEDED
Status: DISCONTINUED | OUTPATIENT
Start: 2022-06-29 | End: 2022-06-29 | Stop reason: SURG

## 2022-06-29 RX ORDER — IPRATROPIUM BROMIDE AND ALBUTEROL SULFATE 2.5; .5 MG/3ML; MG/3ML
3 SOLUTION RESPIRATORY (INHALATION) ONCE AS NEEDED
Status: DISCONTINUED | OUTPATIENT
Start: 2022-06-29 | End: 2022-06-29 | Stop reason: HOSPADM

## 2022-06-29 RX ORDER — ONDANSETRON 2 MG/ML
INJECTION INTRAMUSCULAR; INTRAVENOUS AS NEEDED
Status: DISCONTINUED | OUTPATIENT
Start: 2022-06-29 | End: 2022-06-29 | Stop reason: SURG

## 2022-06-29 RX ORDER — MIDAZOLAM HYDROCHLORIDE 2 MG/2ML
INJECTION, SOLUTION INTRAMUSCULAR; INTRAVENOUS AS NEEDED
Status: DISCONTINUED | OUTPATIENT
Start: 2022-06-29 | End: 2022-06-29 | Stop reason: SURG

## 2022-06-29 RX ORDER — PROCHLORPERAZINE EDISYLATE 5 MG/ML
10 INJECTION INTRAMUSCULAR; INTRAVENOUS ONCE AS NEEDED
Status: DISCONTINUED | OUTPATIENT
Start: 2022-06-29 | End: 2022-06-29 | Stop reason: HOSPADM

## 2022-06-29 RX ORDER — HYDROMORPHONE HCL 110MG/55ML
PATIENT CONTROLLED ANALGESIA SYRINGE INTRAVENOUS AS NEEDED
Status: DISCONTINUED | OUTPATIENT
Start: 2022-06-29 | End: 2022-06-29 | Stop reason: SURG

## 2022-06-29 RX ORDER — SENNOSIDES 8.6 MG
650 CAPSULE ORAL EVERY 8 HOURS PRN
Qty: 20 TABLET | Refills: 0 | Status: SHIPPED | OUTPATIENT
Start: 2022-06-29 | End: 2022-07-05

## 2022-06-29 RX ORDER — PROPOFOL 10 MG/ML
INJECTION, EMULSION INTRAVENOUS AS NEEDED
Status: DISCONTINUED | OUTPATIENT
Start: 2022-06-29 | End: 2022-06-29 | Stop reason: SURG

## 2022-06-29 RX ORDER — MEPERIDINE HYDROCHLORIDE 25 MG/ML
12.5 INJECTION INTRAMUSCULAR; INTRAVENOUS; SUBCUTANEOUS
Status: DISCONTINUED | OUTPATIENT
Start: 2022-06-29 | End: 2022-06-29 | Stop reason: HOSPADM

## 2022-06-29 RX ORDER — MAGNESIUM HYDROXIDE 1200 MG/15ML
LIQUID ORAL AS NEEDED
Status: DISCONTINUED | OUTPATIENT
Start: 2022-06-29 | End: 2022-06-29 | Stop reason: HOSPADM

## 2022-06-29 RX ORDER — SODIUM CHLORIDE, SODIUM LACTATE, POTASSIUM CHLORIDE, CALCIUM CHLORIDE 600; 310; 30; 20 MG/100ML; MG/100ML; MG/100ML; MG/100ML
1000 INJECTION, SOLUTION INTRAVENOUS CONTINUOUS
Status: DISCONTINUED | OUTPATIENT
Start: 2022-06-29 | End: 2022-06-29 | Stop reason: HOSPADM

## 2022-06-29 RX ORDER — LIDOCAINE HYDROCHLORIDE 20 MG/ML
INJECTION, SOLUTION INTRAVENOUS AS NEEDED
Status: DISCONTINUED | OUTPATIENT
Start: 2022-06-29 | End: 2022-06-29 | Stop reason: SURG

## 2022-06-29 RX ORDER — ONDANSETRON 2 MG/ML
4 INJECTION INTRAMUSCULAR; INTRAVENOUS ONCE AS NEEDED
Status: DISCONTINUED | OUTPATIENT
Start: 2022-06-29 | End: 2022-06-29 | Stop reason: HOSPADM

## 2022-06-29 RX ORDER — IBUPROFEN 800 MG/1
800 TABLET ORAL EVERY 6 HOURS PRN
Qty: 20 TABLET | Refills: 0 | Status: SHIPPED | OUTPATIENT
Start: 2022-06-29 | End: 2022-07-05 | Stop reason: SDUPTHER

## 2022-06-29 RX ADMIN — ONDANSETRON 4 MG: 2 INJECTION INTRAMUSCULAR; INTRAVENOUS at 12:00

## 2022-06-29 RX ADMIN — MIDAZOLAM HYDROCHLORIDE 2 MG: 1 INJECTION, SOLUTION INTRAMUSCULAR; INTRAVENOUS at 11:30

## 2022-06-29 RX ADMIN — SODIUM CHLORIDE, POTASSIUM CHLORIDE, SODIUM LACTATE AND CALCIUM CHLORIDE: 600; 310; 30; 20 INJECTION, SOLUTION INTRAVENOUS at 12:25

## 2022-06-29 RX ADMIN — HYDROMORPHONE HYDROCHLORIDE 0.5 MG: 2 INJECTION INTRAMUSCULAR; INTRAVENOUS; SUBCUTANEOUS at 12:31

## 2022-06-29 RX ADMIN — Medication 20 MG: at 11:30

## 2022-06-29 RX ADMIN — PROPOFOL 200 MG: 10 INJECTION, EMULSION INTRAVENOUS at 11:36

## 2022-06-29 RX ADMIN — KETOROLAC TROMETHAMINE 30 MG: 30 INJECTION, SOLUTION INTRAMUSCULAR at 12:00

## 2022-06-29 RX ADMIN — CEFAZOLIN SODIUM 2 G: 2 SOLUTION INTRAVENOUS at 11:40

## 2022-06-29 RX ADMIN — MEPERIDINE HYDROCHLORIDE 12.5 MG: 25 INJECTION INTRAMUSCULAR; INTRAVENOUS; SUBCUTANEOUS at 13:20

## 2022-06-29 RX ADMIN — DEXAMETHASONE SODIUM PHOSPHATE 4 MG: 4 INJECTION, SOLUTION INTRAMUSCULAR; INTRAVENOUS at 12:00

## 2022-06-29 RX ADMIN — SODIUM CHLORIDE, POTASSIUM CHLORIDE, SODIUM LACTATE AND CALCIUM CHLORIDE 1000 ML: 600; 310; 30; 20 INJECTION, SOLUTION INTRAVENOUS at 10:22

## 2022-06-29 RX ADMIN — HYDROMORPHONE HYDROCHLORIDE 0.5 MG: 2 INJECTION INTRAMUSCULAR; INTRAVENOUS; SUBCUTANEOUS at 12:40

## 2022-06-29 RX ADMIN — LIDOCAINE HYDROCHLORIDE 100 MG: 20 INJECTION, SOLUTION INTRAVENOUS at 11:36

## 2022-06-29 NOTE — ANESTHESIA PREPROCEDURE EVALUATION
Anesthesia Evaluation     Patient summary reviewed and Nursing notes reviewed   NPO Solid Status: > 8 hours  NPO Liquid Status: > 8 hours           Airway   Mallampati: III  TM distance: >3 FB  Neck ROM: full  Possible difficult intubation  Dental - normal exam     Pulmonary - normal exam   Cardiovascular - normal exam    (+) valvular problems/murmurs murmur,       Neuro/Psych  (+) TIA, psychiatric history Anxiety, Depression and Bipolar,    GI/Hepatic/Renal/Endo    (+)   hepatitis C, liver disease,     Musculoskeletal     Abdominal  - normal exam   Substance History   (+) alcohol use, drug use     OB/GYN          Other        ROS/Med Hx Other: Last used IV drugs March 2022.    Patient in rehab                    Anesthesia Plan    ASA 2     general     (Risks and benefits discussed including risk of aspiration, recall and dental damage. All patient questions answered.    Will continue with plan of care.)  intravenous induction     Anesthetic plan, risks, benefits, and alternatives have been provided, discussed and informed consent has been obtained with: patient.        CODE STATUS:

## 2022-06-29 NOTE — ANESTHESIA POSTPROCEDURE EVALUATION
Patient: José Luis Byrd    Procedure Summary     Date: 06/29/22 Room / Location: [facility] OR  /  [facility]    Anesthesia Start: 1125 Anesthesia Stop: 1253    Procedure: INGUINAL HERNIA REPAIR WITH MESH (Left Abdomen) Diagnosis:       Non-recurrent unilateral inguinal hernia without obstruction or gangrene      (Non-recurrent unilateral inguinal hernia without obstruction or gangrene [K40.90])    Surgeons: Edison Aj MD Provider: Daniela Toro CRNA    Anesthesia Type: general ASA Status: 2          Anesthesia Type: general    Vitals  Vitals Value Taken Time   /72 06/29/22 1350   Temp 98.6 °F (37 °C) 06/29/22 1340   Pulse 67 06/29/22 1355   Resp 12 06/29/22 1350   SpO2 96 % 06/29/22 1355   Vitals shown include unvalidated device data.        Post Anesthesia Care and Evaluation    Patient location during evaluation: PHASE II  Patient participation: complete - patient participated  Level of consciousness: awake and alert  Pain score: 2  Pain management: satisfactory to patient    Airway patency: patent  Anesthetic complications: No anesthetic complications  PONV Status: none  Cardiovascular status: acceptable and stable  Respiratory status: acceptable  Hydration status: acceptable    Comments: Vitals signs as noted in nursing documentation as per protocol.    Pt reports swollen right lower lip. It was not noted in PACU but pt was shivering upon emergence. Pt instructed to use ice and ibuprofen and tylenol and to report to MD if it doesn't resolve.

## 2022-06-29 NOTE — ANESTHESIA PROCEDURE NOTES
Airway  Urgency: elective    Date/Time: 6/29/2022 11:37 AM    General Information and Staff    Patient location during procedure: OR  CRNA/CAA: Daniela Toro CRNA    Indications and Patient Condition    Preoxygenated: yes  Mask difficulty assessment: 1 - vent by mask    Final Airway Details  Final airway type: supraglottic airway      Successful airway: classic  Size 4  Airway Seal Pressure (cm H2O): 20    Number of attempts at approach: 1  Assessment: lips, teeth, and gum same as pre-op    Additional Comments  LMA seats well

## 2022-06-30 ENCOUNTER — TELEPHONE (OUTPATIENT)
Dept: SURGERY | Facility: CLINIC | Age: 32
End: 2022-06-30

## 2022-06-30 NOTE — TELEPHONE ENCOUNTER
Patient contacted message left with information provided by Dr. Aj advised patient that if he has any toher concerns please call our office back to discuss.   ----- Message from Edison Aj MD sent at 6/27/2022  2:55 PM EDT -----  Have him repeat LFT in a month.  Let him know that his liver tests are just mildly abnormal  ----- Message -----  From: Lab, Background User  Sent: 6/27/2022   2:15 PM EDT  To: Edison Aj MD

## 2022-07-05 ENCOUNTER — OFFICE VISIT (OUTPATIENT)
Dept: SURGERY | Facility: CLINIC | Age: 32
End: 2022-07-05

## 2022-07-05 VITALS
HEART RATE: 85 BPM | WEIGHT: 194 LBS | SYSTOLIC BLOOD PRESSURE: 130 MMHG | OXYGEN SATURATION: 98 % | BODY MASS INDEX: 28.73 KG/M2 | DIASTOLIC BLOOD PRESSURE: 92 MMHG | TEMPERATURE: 98.2 F | HEIGHT: 69 IN

## 2022-07-05 DIAGNOSIS — Z48.89 POSTOPERATIVE VISIT: Primary | ICD-10-CM

## 2022-07-05 PROCEDURE — 99024 POSTOP FOLLOW-UP VISIT: CPT | Performed by: SURGERY

## 2022-07-05 RX ORDER — IBUPROFEN 800 MG/1
800 TABLET ORAL EVERY 6 HOURS PRN
Qty: 20 TABLET | Refills: 0 | Status: SHIPPED | OUTPATIENT
Start: 2022-07-05 | End: 2023-07-05

## 2022-07-07 ENCOUNTER — TELEPHONE (OUTPATIENT)
Dept: INTERNAL MEDICINE | Facility: CLINIC | Age: 32
End: 2022-07-07

## 2022-07-07 NOTE — TELEPHONE ENCOUNTER
Tammy at [facility] was calling to see if we can send the patients most recent lab work to them via fax (especially pertaining to his hep c diagnosis) she was saying that he can receive treatment there for this issue.  Their fax number is [phone].  She said if you have any questions or concerns that you can call her at [phone]

## 2022-07-10 DIAGNOSIS — F33.1 MODERATE EPISODE OF RECURRENT MAJOR DEPRESSIVE DISORDER: ICD-10-CM

## 2022-07-11 RX ORDER — BUPROPION HYDROCHLORIDE 150 MG/1
TABLET, EXTENDED RELEASE ORAL
Qty: 90 TABLET | Refills: 1 | Status: SHIPPED | OUTPATIENT
Start: 2022-07-11 | End: 2022-09-02

## 2022-07-12 ENCOUNTER — LAB (OUTPATIENT)
Dept: LAB | Facility: HOSPITAL | Age: 32
End: 2022-07-12

## 2022-07-12 ENCOUNTER — OFFICE VISIT (OUTPATIENT)
Dept: GASTROENTEROLOGY | Facility: CLINIC | Age: 32
End: 2022-07-12

## 2022-07-12 VITALS
WEIGHT: 194.4 LBS | DIASTOLIC BLOOD PRESSURE: 90 MMHG | OXYGEN SATURATION: 99 % | HEART RATE: 96 BPM | BODY MASS INDEX: 28.79 KG/M2 | TEMPERATURE: 97.5 F | SYSTOLIC BLOOD PRESSURE: 120 MMHG | HEIGHT: 69 IN

## 2022-07-12 DIAGNOSIS — B18.2 CHRONIC HEPATITIS C WITHOUT HEPATIC COMA: Primary | ICD-10-CM

## 2022-07-12 DIAGNOSIS — B18.2 CHRONIC HEPATITIS C WITHOUT HEPATIC COMA: ICD-10-CM

## 2022-07-12 LAB
ALBUMIN SERPL-MCNC: 5 G/DL (ref 3.5–5.2)
ALBUMIN/GLOB SERPL: 1.9 G/DL
ALP SERPL-CCNC: 100 U/L (ref 39–117)
ALT SERPL W P-5'-P-CCNC: 282 U/L (ref 1–41)
AMPHET+METHAMPHET UR QL: NEGATIVE
AMPHETAMINES UR QL: NEGATIVE
ANION GAP SERPL CALCULATED.3IONS-SCNC: 11.4 MMOL/L (ref 5–15)
AST SERPL-CCNC: 83 U/L (ref 1–40)
BARBITURATES UR QL SCN: NEGATIVE
BASOPHILS # BLD AUTO: 0.06 10*3/MM3 (ref 0–0.2)
BASOPHILS NFR BLD AUTO: 0.9 % (ref 0–1.5)
BENZODIAZ UR QL SCN: NEGATIVE
BILIRUB SERPL-MCNC: 0.3 MG/DL (ref 0–1.2)
BUN SERPL-MCNC: 16 MG/DL (ref 6–20)
BUN/CREAT SERPL: 18.4 (ref 7–25)
BUPRENORPHINE SERPL-MCNC: POSITIVE NG/ML
CALCIUM SPEC-SCNC: 9.6 MG/DL (ref 8.6–10.5)
CANNABINOIDS SERPL QL: NEGATIVE
CHLORIDE SERPL-SCNC: 101 MMOL/L (ref 98–107)
CO2 SERPL-SCNC: 25.6 MMOL/L (ref 22–29)
COCAINE UR QL: NEGATIVE
CREAT SERPL-MCNC: 0.87 MG/DL (ref 0.76–1.27)
DEPRECATED RDW RBC AUTO: 39 FL (ref 37–54)
EGFRCR SERPLBLD CKD-EPI 2021: 118.3 ML/MIN/1.73
EOSINOPHIL # BLD AUTO: 0.59 10*3/MM3 (ref 0–0.4)
EOSINOPHIL NFR BLD AUTO: 9.3 % (ref 0.3–6.2)
ERYTHROCYTE [DISTWIDTH] IN BLOOD BY AUTOMATED COUNT: 12.4 % (ref 12.3–15.4)
GLOBULIN UR ELPH-MCNC: 2.7 GM/DL
GLUCOSE SERPL-MCNC: 90 MG/DL (ref 65–99)
HBV SURFACE AG SERPL QL IA: NORMAL
HCT VFR BLD AUTO: 44.4 % (ref 37.5–51)
HGB BLD-MCNC: 15.6 G/DL (ref 13–17.7)
HIV1+2 AB SER QL: NORMAL
IMM GRANULOCYTES # BLD AUTO: 0 10*3/MM3 (ref 0–0.05)
IMM GRANULOCYTES NFR BLD AUTO: 0 % (ref 0–0.5)
INR PPP: 0.96 (ref 0.84–1.13)
LYMPHOCYTES # BLD AUTO: 2.1 10*3/MM3 (ref 0.7–3.1)
LYMPHOCYTES NFR BLD AUTO: 33 % (ref 19.6–45.3)
MCH RBC QN AUTO: 31 PG (ref 26.6–33)
MCHC RBC AUTO-ENTMCNC: 35.1 G/DL (ref 31.5–35.7)
MCV RBC AUTO: 88.3 FL (ref 79–97)
METHADONE UR QL SCN: NEGATIVE
MONOCYTES # BLD AUTO: 0.52 10*3/MM3 (ref 0.1–0.9)
MONOCYTES NFR BLD AUTO: 8.2 % (ref 5–12)
NEUTROPHILS NFR BLD AUTO: 3.09 10*3/MM3 (ref 1.7–7)
NEUTROPHILS NFR BLD AUTO: 48.6 % (ref 42.7–76)
NRBC BLD AUTO-RTO: 0 /100 WBC (ref 0–0.2)
OPIATES UR QL: NEGATIVE
OXYCODONE UR QL SCN: NEGATIVE
PCP UR QL SCN: NEGATIVE
PLATELET # BLD AUTO: 301 10*3/MM3 (ref 140–450)
PMV BLD AUTO: 10.5 FL (ref 6–12)
POTASSIUM SERPL-SCNC: 4.2 MMOL/L (ref 3.5–5.2)
PROPOXYPH UR QL: NEGATIVE
PROT SERPL-MCNC: 7.7 G/DL (ref 6–8.5)
PROTHROMBIN TIME: 12.6 SECONDS (ref 11.4–14.4)
RBC # BLD AUTO: 5.03 10*6/MM3 (ref 4.14–5.8)
SODIUM SERPL-SCNC: 138 MMOL/L (ref 136–145)
TRICYCLICS UR QL SCN: NEGATIVE
WBC NRBC COR # BLD: 6.36 10*3/MM3 (ref 3.4–10.8)

## 2022-07-12 PROCEDURE — 87340 HEPATITIS B SURFACE AG IA: CPT

## 2022-07-12 PROCEDURE — 80053 COMPREHEN METABOLIC PANEL: CPT

## 2022-07-12 PROCEDURE — 36415 COLL VENOUS BLD VENIPUNCTURE: CPT

## 2022-07-12 PROCEDURE — 85025 COMPLETE CBC W/AUTO DIFF WBC: CPT

## 2022-07-12 PROCEDURE — 86708 HEPATITIS A ANTIBODY: CPT

## 2022-07-12 PROCEDURE — 87522 HEPATITIS C REVRS TRNSCRPJ: CPT

## 2022-07-12 PROCEDURE — 85610 PROTHROMBIN TIME: CPT

## 2022-07-12 PROCEDURE — 86704 HEP B CORE ANTIBODY TOTAL: CPT

## 2022-07-12 PROCEDURE — 99214 OFFICE O/P EST MOD 30 MIN: CPT | Performed by: NURSE PRACTITIONER

## 2022-07-12 PROCEDURE — G0432 EIA HIV-1/HIV-2 SCREEN: HCPCS

## 2022-07-12 PROCEDURE — 80306 DRUG TEST PRSMV INSTRMNT: CPT

## 2022-07-12 PROCEDURE — 81596 NFCT DS CHRNC HCV 6 ASSAYS: CPT

## 2022-07-12 PROCEDURE — 87902 NFCT AGT GNTYP ALYS HEP C: CPT

## 2022-07-12 RX ORDER — NALOXONE HYDROCHLORIDE 4 MG/.1ML
SPRAY NASAL
COMMUNITY
Start: 2022-07-07

## 2022-07-12 RX ORDER — BUPRENORPHINE HYDROCHLORIDE AND NALOXONE HYDROCHLORIDE DIHYDRATE 8; 2 MG/1; MG/1
TABLET SUBLINGUAL
COMMUNITY
Start: 2022-07-07

## 2022-07-12 NOTE — PROGRESS NOTES
New Patient Consultation     Patient Name: José Luis Byrd  : 1990   MRN: [id number]     Chief Complaint:    Chief Complaint   Patient presents with   • Hepatitis     HEP C       History of Present Illness: José Luis Byrd is a 32 y.o. male who is here today for a Gastroenterology Consultation for hepatitis C.    Hep C: Dx about 2 years ago. He has never received treatment.       The patient denies extrahepatic manifestations/associated symptoms of HCV. Specifically denies arthralgias, myalgias and pruritis and exhibits no stigmata of advanced liver disease. He does reports fatigue. There is nausea in the morning. There is no heartburn.         IVDU - yes- sober for about 4 months- at a sober living house  ALYSHA - yes  Unprofessional tattoo - yes  Unprofessional body piercing - no  Incarceration - yes  Healthcare worker - no   - no  HCV sexual partner - yes  Blood transfusion prior to  - no  Maternal transmission - no    Had hepatits A in the past. Admits to hx of heavy alcohol use- currently sober  Subjective      Review of Systems:   Review of Systems   Constitutional: Positive for fatigue. Negative for activity change, appetite change, diaphoresis, unexpected weight gain and unexpected weight loss.   Cardiovascular: Negative for leg swelling.   Gastrointestinal: Positive for abdominal distention, abdominal pain and nausea. Negative for blood in stool.   Musculoskeletal: Negative for arthralgias and myalgias.   Skin: Negative for color change and pallor.   Allergic/Immunologic: Negative for immunocompromised state.   Neurological: Negative for confusion.   Hematological: Does not bruise/bleed easily.       Past Medical History:   Past Medical History:   Diagnosis Date   • Anxiety    • Bipolar 1 disorder (HCC)    • Depression    • Heart murmur    • Infectious viral hepatitis     HEP C   • PTSD (post-traumatic stress disorder)    • Sleep paralysis    • Substance abuse (HCC)     IV  "drugs and ETOH   • Tattoo    • TIA (transient ischemic attack) 2019    no residual       Past Surgical History:   Past Surgical History:   Procedure Laterality Date   • HAND SURGERY Right     fracture surgery   • INGUINAL HERNIA REPAIR Left 6/29/2022    Procedure: INGUINAL HERNIA REPAIR WITH MESH;  Surgeon: Edison Aj MD;  Location: [facility];  Service: General;  Laterality: Left;   • KNEE SURGERY Right     \"staph infection\"       Family History:   Family History   Problem Relation Age of Onset   • No Known Problems Mother    • No Known Problems Father    • Depression Sister    • Diabetes Maternal Aunt    • Cancer Maternal Grandfather         LUNG       Social History:   Social History     Socioeconomic History   • Marital status: Legally    Tobacco Use   • Smoking status: Never Smoker   • Smokeless tobacco: Current User     Types: Snuff   Vaping Use   • Vaping Use: Former   • Substances: Nicotine   Substance and Sexual Activity   • Alcohol use: Not Currently   • Drug use: Yes     Types: Fentanyl, Methamphetamines, Marijuana, Heroin     Comment: sober since 3/18/22   • Sexual activity: Defer       Alcohol/Tobacco History:   Social History     Substance and Sexual Activity   Alcohol Use Not Currently     Social History     Tobacco Use   Smoking Status Never Smoker   Smokeless Tobacco Current User   • Types: Snuff       Medications:     Current Outpatient Medications:   •  buprenorphine-naloxone (SUBOXONE) 8-2 MG per SL tablet, DISSOLVE 2 TABLETS UNDER THE TONGUE DAILY, Disp: , Rfl:   •  buPROPion SR (WELLBUTRIN SR) 150 MG 12 hr tablet, TAKE 2 TABLETS BY MOUTH EVERY MORNING AND 1 TABLET EVERY EVENING, Disp: 90 tablet, Rfl: 1  •  busPIRone (BUSPAR) 15 MG tablet, Take 1 tablet by mouth 3 (Three) Times a Day., Disp: 270 tablet, Rfl: 1  •  ibuprofen (ADVIL,MOTRIN) 800 MG tablet, Take 1 tablet by mouth Every 6 (Six) Hours As Needed for Moderate Pain ., Disp: 20 tablet, Rfl: 0  •  mirtazapine (REMERON) 45 " "MG tablet, Take 1 tablet by mouth Every Night., Disp: 30 tablet, Rfl: 2  •  Multiple Vitamins-Iron (multivitamin with iron) tablet tablet, Take 1 tablet by mouth Daily., Disp: 90 each, Rfl: 3  •  naloxone (NARCAN) 4 MG/0.1ML nasal spray, CALL 911. SPR CONTENTS OF ONE SPRAYER (0.1ML) INTO ONE NOSTRIL. REPEAT IN 2-3 MIN IF SYMPTOMS OF OPIOID EMERGENCY PERSIST, ALTERNATE NOSTRILS, Disp: , Rfl:   •  QUEtiapine (SEROquel) 300 MG tablet, Take 1 tablet by mouth 2 (Two) Times a Day., Disp: 180 tablet, Rfl: 1    Allergies:   No Known Allergies    Objective     Physical Exam:  Vital Signs:   Vitals:    07/12/22 1408   BP: 120/90   BP Location: Left arm   Patient Position: Sitting   Cuff Size: Adult   Pulse: 96   Temp: 97.5 °F (36.4 °C)   TempSrc: Temporal   SpO2: 99%   Weight: 88.2 kg (194 lb 6.4 oz)   Height: 175.3 cm (69.02\")     Body mass index is 28.69 kg/m².     Physical Exam  Vitals and nursing note reviewed.   Constitutional:       General: He is not in acute distress.     Appearance: He is well-developed. He is not diaphoretic.   Eyes:      General: No scleral icterus.     Conjunctiva/sclera: Conjunctivae normal.   Neck:      Thyroid: No thyromegaly.   Cardiovascular:      Rate and Rhythm: Normal rate and regular rhythm.   Pulmonary:      Effort: Pulmonary effort is normal.      Breath sounds: Normal breath sounds.   Abdominal:      General: Bowel sounds are normal. There is distension.      Palpations: Abdomen is soft. There is hepatomegaly. There is no shifting dullness.      Tenderness: There is no abdominal tenderness. There is no guarding or rebound.      Hernia: No hernia is present.   Musculoskeletal:      Cervical back: Neck supple.      Right lower leg: No edema.      Left lower leg: No edema.   Skin:     General: Skin is warm and dry.      Capillary Refill: Capillary refill takes 2 to 3 seconds.      Coloration: Skin is not jaundiced or pale.      Findings: No bruising or petechiae.      Nails: There is no " clubbing.   Neurological:      Mental Status: He is alert and oriented to person, place, and time.   Psychiatric:         Behavior: Behavior normal.         Thought Content: Thought content normal.         Judgment: Judgment normal.         Assessment / Plan      Assessment/Plan:   Diagnoses and all orders for this visit:    1. Chronic hepatitis C without hepatic coma (HCC) (Primary)  -     HCV FibroSURE; Future  -     Urine Drug Screen - Urine, Clean Catch; Future  -     Hepatitis A Antibody, Total; Future  -     Hepatitis B Core Antibody, Total; Future  -     Hepatitis B Surface Antigen; Future  -     Hepatitis C RNA, Quantitative, PCR (graph); Future  -     Protime-INR; Future  -     Comprehensive Metabolic Panel; Future  -     CBC & Differential; Future  -     HIV-1 & HIV-2 Antibodies; Future  -     Hepatitis C Genotype; Future     -Outside records reviewed  -diagnosed approx 2020  -HCV risk factors as discussed in HPI  -treatment naive  -Discussed the nature, workup, and transmission of Hepatitis C virus; advised to avoid sharing toothbrush, dental and shaving equipment, nail clippers  -Recommend vaccination series for Hepatitis  B.  -Labs per HCV consultation protocol-we will plan to start Epclusa, 12-week course.  Side effects and medication instructions discussed. - Recommend lifelong alcohol avoidance given history of alcohol abuse        Follow Up:   Return in about 3 months (around 10/12/2022), or if symptoms worsen or fail to improve.    Plan of care reviewed with the patient at the conclusion of today's visit.  Education was provided regarding diagnosis, management, and any prescribed or recommended OTC medications.  Patient verbalized understanding of and agreement with management plan.         DEZ France  [facility]

## 2022-07-13 LAB
HAV AB SER QL IA: POSITIVE
HBV CORE AB SERPL QL IA: NEGATIVE

## 2022-07-14 LAB
A2 MACROGLOB SERPL-MCNC: 144 MG/DL (ref 110–276)
ALT SERPL W P-5'-P-CCNC: 327 IU/L (ref 0–55)
APO A-I SERPL-MCNC: 154 MG/DL (ref 101–178)
BILIRUB SERPL-MCNC: 0.3 MG/DL (ref 0–1.2)
FIBROSIS SCORING:: ABNORMAL
FIBROSIS STAGE SERPL QL: ABNORMAL
GGT SERPL-CCNC: 54 IU/L (ref 0–65)
HAPTOGLOB SERPL-MCNC: 86 MG/DL (ref 17–317)
HCV AB SER QL: ABNORMAL
HCV GENTYP SERPL NAA+PROBE: NORMAL
HCV RNA SERPL NAA+PROBE-ACNC: NORMAL IU/ML
HCV RNA SERPL NAA+PROBE-LOG IU: 5.21 LOG10 IU/ML
LABORATORY COMMENT REPORT: ABNORMAL
LABORATORY COMMENT REPORT: NORMAL
LIVER FIBR SCORE SERPL CALC.FIBROSURE: 0.08 (ref 0–0.21)
NECROINFLAMM ACTIVITY SCORING:: ABNORMAL
NECROINFLAMMATORY ACT GRADE SERPL QL: ABNORMAL
NECROINFLAMMATORY ACT SCORE SERPL: 0.87 (ref 0–0.17)
SERVICE CMNT-IMP: ABNORMAL
TEST INFORMATION: NORMAL

## 2022-07-15 ENCOUNTER — PRIOR AUTHORIZATION (OUTPATIENT)
Dept: GASTROENTEROLOGY | Facility: CLINIC | Age: 32
End: 2022-07-15

## 2022-07-15 DIAGNOSIS — B18.2 CHRONIC HEPATITIS C WITHOUT HEPATIC COMA: Primary | ICD-10-CM

## 2022-07-15 RX ORDER — VELPATASVIR AND SOFOSBUVIR 100; 400 MG/1; MG/1
1 TABLET, FILM COATED ORAL DAILY
Qty: 30 TABLET | Refills: 2 | Status: SHIPPED | OUTPATIENT
Start: 2022-07-15 | End: 2022-07-25 | Stop reason: SDUPTHER

## 2022-07-20 DIAGNOSIS — F33.1 MODERATE EPISODE OF RECURRENT MAJOR DEPRESSIVE DISORDER: ICD-10-CM

## 2022-07-22 RX ORDER — MIRTAZAPINE 45 MG/1
45 TABLET, FILM COATED ORAL NIGHTLY
Qty: 30 TABLET | Refills: 2 | Status: SHIPPED | OUTPATIENT
Start: 2022-07-22

## 2022-07-22 NOTE — TELEPHONE ENCOUNTER
Rx Refill Note  Requested Prescriptions     Pending Prescriptions Disp Refills   • mirtazapine (REMERON) 45 MG tablet [Pharmacy Med Name: MIRTAZAPINE 45MG TABLETS] 30 tablet 2     Sig: TAKE 1 TABLET BY MOUTH EVERY NIGHT      Last filled: 04/22/2022  Last office visit with prescribing clinician: 5/20/2022      Next office visit with prescribing clinician: 11/21/2022 April TOMÁS Bailon MA  07/22/22, 15:50 EDT

## 2022-07-25 DIAGNOSIS — B18.2 CHRONIC HEPATITIS C WITHOUT HEPATIC COMA: ICD-10-CM

## 2022-07-25 RX ORDER — VELPATASVIR AND SOFOSBUVIR 100; 400 MG/1; MG/1
1 TABLET, FILM COATED ORAL DAILY
Qty: 30 TABLET | Refills: 2 | Status: SHIPPED | OUTPATIENT
Start: 2022-07-25

## 2022-08-15 ENCOUNTER — TELEPHONE (OUTPATIENT)
Dept: INTERNAL MEDICINE | Facility: CLINIC | Age: 32
End: 2022-08-15

## 2022-08-15 NOTE — TELEPHONE ENCOUNTER
Caller: José Luis Byrd    Relationship: Self    Best call back number: [phone]    What form or medical record are you requesting: A CERTIFIED COPY OF MEDICAL RECORDS WITH PATIENT'S NAME AND MEDICAL ID NUMBER TO SHOW IDENTITY    Who is requesting this form or medical record from you: SOCIAL SECURITY OFFICE    How would you like to receive the form or medical records (pick-up, mail, fax):     Timeframe paperwork needed: ASAP    Additional notes: PLEASE CALL WHEN READY FOR

## 2022-08-22 NOTE — TELEPHONE ENCOUNTER
Called and lvm for patient to return call, office number given.     HUB: if patient returns call please apologize to him as we can not figure out how to do this. Please give him the number for HIM so they can print this information out.

## 2022-08-23 NOTE — TELEPHONE ENCOUNTER
After searching we found a way to print all the info pt needs and is printed on letterhead.    LVM that this has been done but Elba will need to sign the paper and once she signs tmrw we will call him to let him know that it is complete.

## 2022-09-02 DIAGNOSIS — F33.1 MODERATE EPISODE OF RECURRENT MAJOR DEPRESSIVE DISORDER: ICD-10-CM

## 2022-09-02 RX ORDER — BUPROPION HYDROCHLORIDE 150 MG/1
TABLET, EXTENDED RELEASE ORAL
Qty: 90 TABLET | Refills: 1 | Status: SHIPPED | OUTPATIENT
Start: 2022-09-02

## 2022-09-09 RX ORDER — QUETIAPINE FUMARATE 300 MG/1
TABLET, FILM COATED ORAL
Qty: 180 TABLET | Refills: 1 | Status: SHIPPED | OUTPATIENT
Start: 2022-09-09 | End: 2023-01-19

## 2023-01-19 DIAGNOSIS — F41.9 ANXIETY: ICD-10-CM

## 2023-01-19 RX ORDER — QUETIAPINE FUMARATE 300 MG/1
TABLET, FILM COATED ORAL
Qty: 180 TABLET | Refills: 1 | Status: SHIPPED | OUTPATIENT
Start: 2023-01-19

## 2023-01-19 RX ORDER — BUSPIRONE HYDROCHLORIDE 15 MG/1
TABLET ORAL
Qty: 270 TABLET | Refills: 1 | Status: SHIPPED | OUTPATIENT
Start: 2023-01-19

## 2023-08-29 DIAGNOSIS — F33.1 MODERATE EPISODE OF RECURRENT MAJOR DEPRESSIVE DISORDER: ICD-10-CM

## 2023-08-31 RX ORDER — BUPROPION HYDROCHLORIDE 150 MG/1
TABLET, EXTENDED RELEASE ORAL
Qty: 30 TABLET | Refills: 0 | Status: SHIPPED | OUTPATIENT
Start: 2023-08-31

## 2023-10-09 ENCOUNTER — TELEMEDICINE (OUTPATIENT)
Dept: INTERNAL MEDICINE | Facility: CLINIC | Age: 33
End: 2023-10-09
Payer: COMMERCIAL

## 2023-10-09 DIAGNOSIS — F33.1 MODERATE EPISODE OF RECURRENT MAJOR DEPRESSIVE DISORDER: Primary | ICD-10-CM

## 2023-10-09 DIAGNOSIS — F41.9 ANXIETY: ICD-10-CM

## 2023-10-09 DIAGNOSIS — F33.1 MODERATE EPISODE OF RECURRENT MAJOR DEPRESSIVE DISORDER: ICD-10-CM

## 2023-10-09 DIAGNOSIS — B19.20 HEPATITIS C VIRUS INFECTION WITHOUT HEPATIC COMA, UNSPECIFIED CHRONICITY: ICD-10-CM

## 2023-10-09 DIAGNOSIS — B18.2 CHRONIC HEPATITIS C WITHOUT HEPATIC COMA: ICD-10-CM

## 2023-10-09 DIAGNOSIS — F19.10 SUBSTANCE ABUSE: ICD-10-CM

## 2023-10-09 PROCEDURE — 1159F MED LIST DOCD IN RCRD: CPT | Performed by: NURSE PRACTITIONER

## 2023-10-09 PROCEDURE — 1160F RVW MEDS BY RX/DR IN RCRD: CPT | Performed by: NURSE PRACTITIONER

## 2023-10-09 PROCEDURE — 99214 OFFICE O/P EST MOD 30 MIN: CPT | Performed by: NURSE PRACTITIONER

## 2023-10-09 RX ORDER — BUSPIRONE HYDROCHLORIDE 15 MG/1
15 TABLET ORAL 3 TIMES DAILY
Qty: 270 TABLET | Refills: 1 | Status: SHIPPED | OUTPATIENT
Start: 2023-10-09

## 2023-10-09 RX ORDER — BUPROPION HYDROCHLORIDE 150 MG/1
TABLET, EXTENDED RELEASE ORAL
Qty: 270 TABLET | Refills: 1 | Status: SHIPPED | OUTPATIENT
Start: 2023-10-09

## 2023-10-09 RX ORDER — MULTIVIT/IRON SULF/FOLIC ACID 15MG-0.4MG
1 TABLET ORAL DAILY
Qty: 90 EACH | Refills: 3 | Status: CANCELLED | OUTPATIENT
Start: 2023-10-09

## 2023-10-09 RX ORDER — BUSPIRONE HYDROCHLORIDE 15 MG/1
15 TABLET ORAL 3 TIMES DAILY
Qty: 270 TABLET | Refills: 1 | Status: CANCELLED | OUTPATIENT
Start: 2023-10-09

## 2023-10-09 RX ORDER — MIRTAZAPINE 45 MG/1
45 TABLET, FILM COATED ORAL NIGHTLY
Qty: 30 TABLET | Refills: 2 | Status: CANCELLED | OUTPATIENT
Start: 2023-10-09

## 2023-10-09 RX ORDER — QUETIAPINE FUMARATE 300 MG/1
300 TABLET, FILM COATED ORAL 2 TIMES DAILY
Qty: 180 TABLET | Refills: 1 | Status: CANCELLED | OUTPATIENT
Start: 2023-10-09

## 2023-10-09 RX ORDER — QUETIAPINE FUMARATE 300 MG/1
300 TABLET, FILM COATED ORAL 2 TIMES DAILY
Qty: 180 TABLET | Refills: 1 | Status: SHIPPED | OUTPATIENT
Start: 2023-10-09

## 2023-10-09 RX ORDER — MULTIVIT/IRON SULF/FOLIC ACID 15MG-0.4MG
1 TABLET ORAL DAILY
Qty: 90 EACH | Refills: 3 | Status: SHIPPED | OUTPATIENT
Start: 2023-10-09

## 2023-10-09 RX ORDER — MIRTAZAPINE 45 MG/1
45 TABLET, FILM COATED ORAL NIGHTLY
Qty: 90 TABLET | Refills: 1 | Status: SHIPPED | OUTPATIENT
Start: 2023-10-09

## 2023-10-09 RX ORDER — BUPROPION HYDROCHLORIDE 150 MG/1
TABLET, EXTENDED RELEASE ORAL
Qty: 30 TABLET | Refills: 0 | Status: CANCELLED | OUTPATIENT
Start: 2023-10-09

## 2023-10-09 NOTE — TELEPHONE ENCOUNTER
PATIENT IS CALLING BACK TO SEE IF THE MEDICATION CAN BE SENT TO THE [facility] [address] [address]. HE SAYS THAT HE IS MOSTLY NEEDING TO HAVE THE WELLBUTRIN CALLED IN.

## 2023-10-09 NOTE — TELEPHONE ENCOUNTER
"  Caller: José Lius Byrd \"Natanael\"    Relationship: Self    Requested Prescriptions:   Requested Prescriptions     Pending Prescriptions Disp Refills    Sublocade 300 MG/1.5ML solution prefilled syringe      Sofosbuvir-Velpatasvir 400-100 MG tablet 30 tablet 2     Sig: Take 1 tablet by mouth Daily. For 12 weeks    QUEtiapine (SEROquel) 300 MG tablet 180 tablet 1     Sig: Take 1 tablet by mouth 2 (Two) Times a Day.    nicotine polacrilex (NICORETTE) 4 MG gum      naloxone (NARCAN) 4 MG/0.1ML nasal spray      Multivitamin tablet tablet 30 tablet      Sig: Take 1 tablet by mouth Daily.    mirtazapine (REMERON) 45 MG tablet 30 tablet 2     Sig: Take 1 tablet by mouth Every Night.    Multiple Vitamins-Iron (multivitamin with iron) tablet tablet 90 each 3     Sig: Take 1 tablet by mouth Daily.    busPIRone (BUSPAR) 15 MG tablet 270 tablet 1     Sig: Take 1 tablet by mouth 3 (Three) Times a Day.    buPROPion SR (WELLBUTRIN SR) 150 MG 12 hr tablet 30 tablet 0     Sig: TAKE 2 TABLET BY MOUTH EVERY MORNING AND 1 TABLET BY MOUTH EVERY EVENING    buprenorphine-naloxone (SUBOXONE) 8-2 MG per SL tablet 30 tablet         [id number] [place] [address] AT [facility] [address]  [phone] [facility] [phone] FX     Last office visit with prescribing clinician: 5/20/2022   Last telemedicine visit with prescribing clinician: Visit date not found   Next office visit with prescribing clinician: Visit date not found         Does the patient have less than a 3 day supply:  [x] Yes  [] No    Would you like a call back once the refill request has been completed: [] Yes [x] No    If the office needs to give you a call back, can they leave a voicemail: [] Yes [x] No    Doris Flower Rep   10/09/23 10:42 EDT          "

## 2023-10-09 NOTE — PROGRESS NOTES
Subjective   José Luis Byrd is a 33 y.o. male    Chief Complaint   Patient presents with    Insomnia    Depression    Anxiety    Chronic hep C     History of Present Illness     This was an audio and video enabled telemedicine encounter.    You have chosen to receive care through a telehealth visit.  Do you consent to use a video/audio connection for your medical care today? Yes    My location: my office at [facility]  Pt location: [address] [place] [address]    Pt states that he was recently incarcerated in [place] and is now back in [place] in sober living.     H/O substance abuse.  Has been sober since father's day of 2023.       Insomnia -chronic; currently on Remeron 45 mg nightly, along with Seroquel 300 mg.  Patient request refills.     Depression/anxiety -chronic; current regimen is Wellbutrin  QAM and 150 mg p.o. QHS.  Also taking Buspar 15 mg BID.  Feeling more anxious and would like to increase his Buspar to TID dosing     Hep C - dx'd about 3 years ago, no tx.  Has been referred to GI.       COVID - declines  Flu shot - declines  Tdap - 4/22/22  Hep C screen - 4/2022       The following portions of the patient's history were reviewed and updated as appropriate: allergies, current medications, past family history, past medical history, past social history, past surgical history, and problem list.    Current Outpatient Medications:     buPROPion SR (WELLBUTRIN SR) 150 MG 12 hr tablet, TAKE 2 TABLET BY MOUTH EVERY MORNING AND 1 TABLET BY MOUTH EVERY EVENING, Disp: 270 tablet, Rfl: 1    busPIRone (BUSPAR) 15 MG tablet, Take 1 tablet by mouth 3 (Three) Times a Day., Disp: 270 tablet, Rfl: 1    mirtazapine (REMERON) 45 MG tablet, Take 1 tablet by mouth Every Night., Disp: 90 tablet, Rfl: 1    Multiple Vitamins-Iron (multivitamin with iron) tablet tablet, Take 1 tablet by mouth Daily., Disp: 90 each, Rfl: 3    QUEtiapine (SEROquel) 300 MG tablet, Take 1 tablet by mouth 2 (Two) Times a Day., Disp: 180  tablet, Rfl: 1    buprenorphine-naloxone (SUBOXONE) 8-2 MG per SL tablet, DISSOLVE 2 TABLETS UNDER THE TONGUE DAILY, Disp: , Rfl:     naloxone (NARCAN) 4 MG/0.1ML nasal spray, CALL 911. SPR CONTENTS OF ONE SPRAYER (0.1ML) INTO ONE NOSTRIL. REPEAT IN 2-3 MIN IF SYMPTOMS OF OPIOID EMERGENCY PERSIST, ALTERNATE NOSTRILS, Disp: , Rfl:     Sofosbuvir-Velpatasvir 400-100 MG tablet, Take 1 tablet by mouth Daily. For 12 weeks, Disp: 30 tablet, Rfl: 2    Sublocade 300 MG/1.5ML solution prefilled syringe, , Disp: , Rfl:      Review of Systems   Constitutional:  Negative for chills, fatigue and fever.   Respiratory:  Negative for cough, chest tightness and shortness of breath.    Cardiovascular:  Negative for chest pain.   Gastrointestinal:  Negative for abdominal pain, diarrhea, nausea and vomiting.   Endocrine: Negative for cold intolerance and heat intolerance.   Musculoskeletal:  Negative for arthralgias.   Neurological:  Negative for dizziness.   Psychiatric/Behavioral:  The patient is nervous/anxious.        Objective   Physical Exam  Constitutional:       Appearance: Normal appearance.   HENT:      Head: Normocephalic.      Nose: Nose normal. No congestion.      Mouth/Throat:      Mouth: Mucous membranes are moist.      Pharynx: Oropharynx is clear.   Eyes:      Conjunctiva/sclera: Conjunctivae normal.      Pupils: Pupils are equal, round, and reactive to light.   Pulmonary:      Effort: Pulmonary effort is normal.   Musculoskeletal:         General: Normal range of motion.      Cervical back: Normal range of motion.   Neurological:      Mental Status: He is alert and oriented to person, place, and time.   Psychiatric:         Behavior: Behavior normal.       There were no vitals filed for this visit.      Assessment & Plan   Diagnoses and all orders for this visit:    1. Moderate episode of recurrent major depressive disorder (Primary)  -     buPROPion SR (WELLBUTRIN SR) 150 MG 12 hr tablet; TAKE 2 TABLET BY MOUTH EVERY  MORNING AND 1 TABLET BY MOUTH EVERY EVENING  Dispense: 270 tablet; Refill: 1  -     mirtazapine (REMERON) 45 MG tablet; Take 1 tablet by mouth Every Night.  Dispense: 90 tablet; Refill: 1  -     QUEtiapine (SEROquel) 300 MG tablet; Take 1 tablet by mouth 2 (Two) Times a Day.  Dispense: 180 tablet; Refill: 1    2. Anxiety  -     busPIRone (BUSPAR) 15 MG tablet; Take 1 tablet by mouth 3 (Three) Times a Day.  Dispense: 270 tablet; Refill: 1  -     QUEtiapine (SEROquel) 300 MG tablet; Take 1 tablet by mouth 2 (Two) Times a Day.  Dispense: 180 tablet; Refill: 1    3. Substance abuse    4. Hepatitis C virus infection without hepatic coma, unspecified chronicity  -     Ambulatory Referral to Gastroenterology    Other orders  -     Multiple Vitamins-Iron (multivitamin with iron) tablet tablet; Take 1 tablet by mouth Daily.  Dispense: 90 each; Refill: 3      Meds refilled  BuSpar increased to 3 times daily dosing  Referred back to gastroenterology  Congratulated on sobriety, keep up the great work!  Return in about 3 months (around 1/9/2024) for Annual.

## 2023-10-10 RX ORDER — NALOXONE HYDROCHLORIDE 4 MG/.1ML
SPRAY NASAL
OUTPATIENT
Start: 2023-10-10

## 2023-10-10 RX ORDER — BUPRENORPHINE HYDROCHLORIDE AND NALOXONE HYDROCHLORIDE DIHYDRATE 8; 2 MG/1; MG/1
TABLET SUBLINGUAL
Qty: 30 TABLET | OUTPATIENT
Start: 2023-10-10

## 2023-10-10 RX ORDER — BUPRENORPHINE 300 MG/1
SOLUTION SUBCUTANEOUS
OUTPATIENT
Start: 2023-10-10

## 2023-10-10 RX ORDER — VELPATASVIR AND SOFOSBUVIR 100; 400 MG/1; MG/1
1 TABLET, FILM COATED ORAL DAILY
Qty: 30 TABLET | Refills: 2 | OUTPATIENT
Start: 2023-10-10

## 2023-10-10 RX ORDER — MULTIVITAMIN
1 TABLET ORAL DAILY
Qty: 30 TABLET | OUTPATIENT
Start: 2023-10-10

## 2023-10-19 ENCOUNTER — PRIOR AUTHORIZATION (OUTPATIENT)
Dept: INTERNAL MEDICINE | Facility: CLINIC | Age: 33
End: 2023-10-19
Payer: COMMERCIAL

## 2023-10-20 ENCOUNTER — PRIOR AUTHORIZATION (OUTPATIENT)
Dept: INTERNAL MEDICINE | Facility: CLINIC | Age: 33
End: 2023-10-20
Payer: COMMERCIAL

## 2023-10-20 NOTE — TELEPHONE ENCOUNTER
CARDIOLOGY CONSULTATION      REASON FOR CONSULT: Cardiac risk assessment    REQUESTING PROVIDER: Dr. Aminta Boyle:  AMS, fever, chills    HISTORY OF PRESENT ILLNESS:  Gerry Rangel is a 71y.o. year-old male with past medical history significant for LICA stenosis, hypertension, hyperlipidemia, CAD, and CVA who was evaluated today due to cardiac risk assessment prior to impending carotid surgery. He was actually admitted due to hypotension, weakness, and AMS. Found to have UTI. Seen in ED. He reports weakness, fatigue, chills, fever, confusion, and some dizziness. He denies chest pain, shortness of breath, or palpitations. He has an upcoming stress test planned for 12/6/2022 prior to his carotid endarterectomy with Dr. Olman Shah. Echo from 7/2022 with preserved EF. Records from hospital admission course thus far reviewed. Telemetry reviewed. INPATIENT MEDICATIONS:  Home medications reviewed.     Current Facility-Administered Medications:     sodium chloride (NS) flush 5-40 mL, 5-40 mL, IntraVENous, Q8H, Jas Johnson MD    sodium chloride (NS) flush 5-40 mL, 5-40 mL, IntraVENous, PRN, Jas Johnson MD    acetaminophen (TYLENOL) tablet 650 mg, 650 mg, Oral, Q6H PRN **OR** acetaminophen (TYLENOL) suppository 650 mg, 650 mg, Rectal, Q6H PRN, Jas Johnson MD    polyethylene glycol (MIRALAX) packet 17 g, 17 g, Oral, DAILY PRN, Jas Johnson MD    ondansetron (ZOFRAN ODT) tablet 4 mg, 4 mg, Oral, Q8H PRN **OR** ondansetron (ZOFRAN) injection 4 mg, 4 mg, IntraVENous, Q6H PRN, Jas Johnson MD    [facility] ON 11/9/2022] enoxaparin (LOVENOX) injection 40 mg, 40 mg, SubCUTAneous, DAILY, Jas Johnson MD    glucose chewable tablet 16 g, 4 Tablet, Oral, PRN, Jas Johnson MD    [facility]) injection 1 mg, 1 mg, IntraMUSCular, PRN, Jas Johnson MD    dextrose 10% infusion 0-250 mL, 0-250 mL, IntraVENous, PRN, Tony Valadez MD    insulin lispro (HUMALOG) injection, , SubCUTAneous, PA approved for QUETIAPINE FUMARATE 300mg  Reference number [id number]  10/19/23-10/18/24   AC&HS, Jas Velazquez MD    [facility] ON 11/9/2022] ARIPiprazole (ABILIFY) tablet 2 mg, 2 mg, Oral, DAILY, Jas Velazquez MD    [facility] ON 11/9/2022] aspirin delayed-release tablet 81 mg, 81 mg, Oral, DAILY, Jas Velazquez MD    [facility] ON 11/9/2022] atorvastatin (LIPITOR) tablet 40 mg, 40 mg, Oral, DAILY, Jas Velazquez MD    baclofen (LIORESAL) tablet 5 mg, 5 mg, Oral, BID PRN, Jas Velazquez MD    carvediloL (COREG) tablet 12.5 mg, 12.5 mg, Oral, BID, Jas Velazquez MD    [facility] ON 11/9/2022] clopidogreL (PLAVIX) tablet 75 mg, 75 mg, Oral, DAILY, Jas Velazquez MD    [facility] ON 11/9/2022] escitalopram oxalate (LEXAPRO) tablet 20 mg, 20 mg, Oral, DAILY, Jas Velazquez MD    [facility] ON 11/9/2022] furosemide (LASIX) tablet 40 mg, 40 mg, Oral, DAILY, Jas Velazquez MD    gabapentin (NEURONTIN) capsule 100 mg, 100 mg, Oral, QHS, Jas Velazquez MD    hydrOXYzine HCL (ATARAX) tablet 25 mg, 25 mg, Oral, QHS PRN, Jas Velazquez MD    [facility] ON 11/9/2022] losartan (COZAAR) tablet 100 mg, 100 mg, Oral, DAILY, Jas Velazquez MD    melatonin tablet 10 mg, 10 mg, Oral, Jas Forbes MD    [facility] ON 11/9/2022] omeprazole (PRILOSEC) capsule 20 mg, 20 mg, Oral, DAILY, Jas Velazquez MD    potassium chloride (K-DUR, KLOR-CON M20) SR tablet 20 mEq, 20 mEq, Oral, BID, Nanci Nipper, Jas Remus Coombe, MD    [facility] ON 11/9/2022] senna-docusate (PERICOLACE) 8.6-50 mg per tablet 1 Tablet, 1 Tablet, Oral, DAILY, Jas Velazquez MD    tamsulosin [facility]) capsule 0.4 mg, 0.4 mg, Oral, QHS, Jas Velazquez MD    traMADoL [facility]) tablet 50 mg, 50 mg, Oral, Q6H PRN, Jas Velazquez MD    cefTRIAXone (ROCEPHIN) 2 g in sterile water (preservative free) 20 mL IV syringe, 2 g, IntraVENous, Q24H, Jas Velazquez MD, 2 g at 11/08/22 1412    Current Outpatient Medications:     traMADoL (ULTRAM) 50 mg tablet, Take 50 mg by mouth every six (6) hours as needed for Pain., Disp: , Rfl:     insulin lispro (HUMALOG) 100 unit/mL injection, INITIATE INSULIN CORRECTIVE PROTOCOL: Normal Insulin Sensitivity  For Blood Sugar (mg/dL) of:    Less than 150 =   0 units          150 -199 =   2 units 200 -249 =   4 units 250 -299 =   6 units 300 -349 =   8 units 350 and above = 10 units and Call Physician   Initiate Hypoglycemia protocol if blood glucose is <70 mg/dL Fast Acting - Administer Immediately - or within 15 minutes of start of meal, if mealtime coverage. , Disp: 1 Each, Rfl: 1    clopidogreL (PLAVIX) 75 mg tab, Take 1 Tablet by mouth in the morning., Disp: 30 Tablet, Rfl: 1    ARIPiprazole (ABILIFY) 2 mg tablet, Take 2 mg by mouth in the morning., Disp: , Rfl:     baclofen 5 mg tab, Take 5 mg by mouth two (2) times a day., Disp: , Rfl:     carvediloL (COREG) 12.5 mg tablet, Take 12.5 mg by mouth two (2) times a day. Indications: high blood pressure, Disp: , Rfl:     escitalopram oxalate (LEXAPRO) 20 mg tablet, Take 20 mg by mouth in the morning., Disp: , Rfl:     gabapentin (NEURONTIN) 100 mg capsule, Take 100 mg by mouth nightly., Disp: , Rfl:     hydrOXYzine HCL (ATARAX) 25 mg tablet, Take 25 mg by mouth nightly as needed for Anxiety. , Disp: , Rfl:     furosemide (LASIX) 40 mg tablet, Take 40 mg by mouth in the morning. Indications: visible water retention (Patient not taking: Reported on 9/15/2022), Disp: , Rfl:     losartan (COZAAR) 100 mg tablet, Take 100 mg by mouth in the morning., Disp: , Rfl:     melatonin 5 mg tablet, Take 10 mg by mouth nightly., Disp: , Rfl:     metFORMIN (GLUCOPHAGE) 500 mg tablet, Take 1,000 mg by mouth two (2) times daily (with meals). , Disp: , Rfl:     potassium chloride (K-DUR, KLOR-CON M20) 20 mEq tablet, Take 20 mEq by mouth two (2) times a day., Disp: , Rfl:     senna-docusate (PERICOLACE) 8.6-50 mg per tablet, Take 1 Tablet by mouth in the morning., Disp: , Rfl:     tamsulosin (FLOMAX) 0.4 mg capsule, Take 0.4 mg by mouth nightly., Disp: , Rfl:     aspirin delayed-release 81 mg tablet, Take 81 mg by mouth in the morning., Disp: , Rfl:     omeprazole (PRILOSEC) 20 mg capsule, Take 20 mg by mouth daily. , Disp: , Rfl:     atorvastatin (LIPITOR) 40 mg tablet, Take 40 mg by mouth in the morning., Disp: , Rfl:      ALLERGIES:  Allergies reviewed with the patient,No Known Allergies . FAMILY HISTORY:  Family history reviewed. SOCIAL HISTORY:  Notable for tobacco use, no heavy alcohol or illicit drug use. REVIEW OF SYSTEMS:  Complete review of systems performed, pertinents noted above, all other systems are negative. PHYSICAL EXAMINATION:    General:  Alert, in no acute distress  Cardiovascular:  Regular rate and rhythm  Respiratory:  Clear to auscultation  Abdomen:  Soft  Extremities:  No LE edema    Visit Vitals  BP 99/67   Pulse 63   Temp 97.9 °F (36.6 °C)   Resp 18   Ht 5' 9\" (1.753 m)   Wt 84.8 kg (187 lb)   SpO2 100%   BMI 27.62 kg/m²         Recent labs results and imaging reviewed. Discussed case with Dr. Rob Fritz and our impression and recommendations are as follows:  Preoperative cardiac risk assessment, planned CEA with Dr. Linette Elizabeth, history of CABG, [place]  Echo over the summer with preserved EF  OP stress test planned, initially cancelled in office as he was unable to stop onto the camera but now scheduled here on 12/6  There is a nuclear medicine isotope shortage at the present time and he is here for an active infection and has been hypotensive, without any new symptoms, therefore not appropriate to reschedule stress for today or tomorrow but will continue to follow to see if things change  Hypertension, BP borderline, hold home meds  CAD, no chest pain, on plavix, asa, atorvastatin, coreg at home  UTI, per primary      Thank you for involving us in the care of this patient. Please do not hesitate to call me or Dr. Rob Fritz if additional questions arise.     Sherri Ross NP  11/8/2022

## 2024-01-19 ENCOUNTER — OFFICE VISIT (OUTPATIENT)
Dept: FAMILY MEDICINE CLINIC | Facility: CLINIC | Age: 34
End: 2024-01-19
Payer: COMMERCIAL

## 2024-01-19 ENCOUNTER — LAB (OUTPATIENT)
Dept: LAB | Facility: HOSPITAL | Age: 34
End: 2024-01-19
Payer: COMMERCIAL

## 2024-01-19 VITALS
WEIGHT: 189.3 LBS | SYSTOLIC BLOOD PRESSURE: 124 MMHG | BODY MASS INDEX: 27.94 KG/M2 | DIASTOLIC BLOOD PRESSURE: 78 MMHG | TEMPERATURE: 98.3 F | HEART RATE: 93 BPM | OXYGEN SATURATION: 98 %

## 2024-01-19 DIAGNOSIS — Z86.19 HISTORY OF HEPATITIS C: ICD-10-CM

## 2024-01-19 DIAGNOSIS — Z13.220 SCREENING FOR HYPERLIPIDEMIA: ICD-10-CM

## 2024-01-19 DIAGNOSIS — Z13.29 SCREENING FOR HYPOTHYROIDISM: ICD-10-CM

## 2024-01-19 DIAGNOSIS — F32.A DEPRESSION, UNSPECIFIED DEPRESSION TYPE: ICD-10-CM

## 2024-01-19 DIAGNOSIS — R53.83 FATIGUE, UNSPECIFIED TYPE: ICD-10-CM

## 2024-01-19 DIAGNOSIS — F43.10 PTSD (POST-TRAUMATIC STRESS DISORDER): ICD-10-CM

## 2024-01-19 DIAGNOSIS — Z76.89 ENCOUNTER TO ESTABLISH CARE WITH NEW DOCTOR: ICD-10-CM

## 2024-01-19 DIAGNOSIS — F39 MOOD DISORDER: Primary | ICD-10-CM

## 2024-01-19 DIAGNOSIS — K62.5 BLOOD PER RECTUM: ICD-10-CM

## 2024-01-19 LAB
ALBUMIN SERPL-MCNC: 5.1 G/DL (ref 3.5–5.2)
ALBUMIN/GLOB SERPL: 2 G/DL
ALP SERPL-CCNC: 68 U/L (ref 39–117)
ALT SERPL W P-5'-P-CCNC: 18 U/L (ref 1–41)
ANION GAP SERPL CALCULATED.3IONS-SCNC: 12 MMOL/L (ref 5–15)
AST SERPL-CCNC: 23 U/L (ref 1–40)
BASOPHILS # BLD AUTO: 0.05 10*3/MM3 (ref 0–0.2)
BASOPHILS NFR BLD AUTO: 0.9 % (ref 0–1.5)
BILIRUB SERPL-MCNC: <0.2 MG/DL (ref 0–1.2)
BUN SERPL-MCNC: 15 MG/DL (ref 6–20)
BUN/CREAT SERPL: 14.2 (ref 7–25)
CALCIUM SPEC-SCNC: 9.8 MG/DL (ref 8.6–10.5)
CHLORIDE SERPL-SCNC: 103 MMOL/L (ref 98–107)
CHOLEST SERPL-MCNC: 169 MG/DL (ref 0–200)
CO2 SERPL-SCNC: 25 MMOL/L (ref 22–29)
CREAT SERPL-MCNC: 1.06 MG/DL (ref 0.76–1.27)
DEPRECATED RDW RBC AUTO: 41.5 FL (ref 37–54)
EGFRCR SERPLBLD CKD-EPI 2021: 95.6 ML/MIN/1.73
EOSINOPHIL # BLD AUTO: 0.38 10*3/MM3 (ref 0–0.4)
EOSINOPHIL NFR BLD AUTO: 6.5 % (ref 0.3–6.2)
ERYTHROCYTE [DISTWIDTH] IN BLOOD BY AUTOMATED COUNT: 12.8 % (ref 12.3–15.4)
GLOBULIN UR ELPH-MCNC: 2.6 GM/DL
GLUCOSE SERPL-MCNC: 95 MG/DL (ref 65–99)
HBV SURFACE AG SERPL QL IA: NORMAL
HCT VFR BLD AUTO: 45.2 % (ref 37.5–51)
HDLC SERPL-MCNC: 58 MG/DL (ref 40–60)
HGB BLD-MCNC: 15.6 G/DL (ref 13–17.7)
HIV 1+2 AB+HIV1 P24 AG SERPL QL IA: NORMAL
IMM GRANULOCYTES # BLD AUTO: 0.01 10*3/MM3 (ref 0–0.05)
IMM GRANULOCYTES NFR BLD AUTO: 0.2 % (ref 0–0.5)
LDLC SERPL CALC-MCNC: 94 MG/DL (ref 0–100)
LDLC/HDLC SERPL: 1.59 {RATIO}
LYMPHOCYTES # BLD AUTO: 2.28 10*3/MM3 (ref 0.7–3.1)
LYMPHOCYTES NFR BLD AUTO: 39 % (ref 19.6–45.3)
MCH RBC QN AUTO: 31.2 PG (ref 26.6–33)
MCHC RBC AUTO-ENTMCNC: 34.5 G/DL (ref 31.5–35.7)
MCV RBC AUTO: 90.4 FL (ref 79–97)
MONOCYTES # BLD AUTO: 0.51 10*3/MM3 (ref 0.1–0.9)
MONOCYTES NFR BLD AUTO: 8.7 % (ref 5–12)
NEUTROPHILS NFR BLD AUTO: 2.62 10*3/MM3 (ref 1.7–7)
NEUTROPHILS NFR BLD AUTO: 44.7 % (ref 42.7–76)
NRBC BLD AUTO-RTO: 0 /100 WBC (ref 0–0.2)
PLATELET # BLD AUTO: 238 10*3/MM3 (ref 140–450)
PMV BLD AUTO: 11.8 FL (ref 6–12)
POTASSIUM SERPL-SCNC: 5.1 MMOL/L (ref 3.5–5.2)
PROT SERPL-MCNC: 7.7 G/DL (ref 6–8.5)
RBC # BLD AUTO: 5 10*6/MM3 (ref 4.14–5.8)
SODIUM SERPL-SCNC: 140 MMOL/L (ref 136–145)
TRIGL SERPL-MCNC: 93 MG/DL (ref 0–150)
TSH SERPL DL<=0.05 MIU/L-ACNC: 2.04 UIU/ML (ref 0.27–4.2)
VLDLC SERPL-MCNC: 17 MG/DL (ref 5–40)
WBC NRBC COR # BLD AUTO: 5.85 10*3/MM3 (ref 3.4–10.8)

## 2024-01-19 PROCEDURE — 36415 COLL VENOUS BLD VENIPUNCTURE: CPT

## 2024-01-19 PROCEDURE — 85025 COMPLETE CBC W/AUTO DIFF WBC: CPT

## 2024-01-19 PROCEDURE — 87522 HEPATITIS C REVRS TRNSCRPJ: CPT

## 2024-01-19 PROCEDURE — 80053 COMPREHEN METABOLIC PANEL: CPT

## 2024-01-19 PROCEDURE — 99214 OFFICE O/P EST MOD 30 MIN: CPT | Performed by: FAMILY MEDICINE

## 2024-01-19 PROCEDURE — G0432 EIA HIV-1/HIV-2 SCREEN: HCPCS

## 2024-01-19 PROCEDURE — 80061 LIPID PANEL: CPT

## 2024-01-19 PROCEDURE — 87340 HEPATITIS B SURFACE AG IA: CPT

## 2024-01-19 PROCEDURE — 84443 ASSAY THYROID STIM HORMONE: CPT

## 2024-01-19 NOTE — PROGRESS NOTES
New Patient Office Visit      Date: 2024  Patient Name: José Luis Byrd  : 1990   MRN: [id number]     Chief Complaint:    Chief Complaint   Patient presents with    Shortness of Breath    Fatigue       History of Present Illness: José Luis Bydr is a 33 y.o. male who presents today as new patient.  Currently seeing DEZ Beatty for PCP.  Off [place].    Reports lives closer to this office now.      History of substance abuse-IV drugs, and alcohol per chart review. Meth, and Fentanyl in the past.  Patient reports he previoiusly tried to kill himself.  Has a counselor at [facility].  States he has classes every day.      Reports previously in snf multiple times.  Reports was incarcerated for 29 months for the longest stretch.    Patient states since , he has been in snf for about 7 years of his life if he counts total days.  Reports he was in and out multiple times.    Reports he has been released since .    Has history of bipolar disorder, PTSD.   Later on in the appointment, he mentions history of ADHD.    In chart noted history of fentanyl, marijuana, heroin, methamphetamine use.  Reports is seen by [facility] [place] [address].  At a sober living place now.  Reports he is in a 6 month to a year Program.    Reports his last use was around father's day.    Prev treated for Hep C.  Reports was treated but missed the end of treatment.  States he was  in snf and missed last part of treatment.  Did not follow-up with Gastroenterology.    Was seen by telemedicine on 10/9/2023 per Elba Ricks.  Reported recently incarcerated in [place] now back in [place] and at sober living.  History of substance abuse.  At time of visit, had reported had been sober since Father's Day of .     Patient declined COVID-vaccine, flu vaccine.  Had previous Tdap on 2022, and hep C screen on 2022 noted per chart review.      Reports had previous CT scan  "right knee.  Reports has neuropathy.  Age 10.  Patient to sign for records from [place], [place]      Reports intermittent bleeding per rectum.  Reports noticed this has increased for the last year.            Subjective      Review of Systems:   Review of Systems   Constitutional:  Positive for fatigue. Negative for chills and fever.   Respiratory:  Positive for shortness of breath.    Gastrointestinal:  Negative for nausea (occ) and vomiting.   Neurological:  Negative for seizures and syncope.   Psychiatric/Behavioral:          + Depression         Past Medical History:   Past Medical History:   Diagnosis Date    Anxiety     Bipolar 1 disorder     Depression     Heart murmur     Infectious viral hepatitis     HEP C    PTSD (post-traumatic stress disorder)     Sleep paralysis     Substance abuse     IV drugs and ETOH    Tattoo     TIA (transient ischemic attack) 2019    no residual       Past Surgical History:   Past Surgical History:   Procedure Laterality Date    HAND SURGERY Right     fracture surgery    INGUINAL HERNIA REPAIR Left 6/29/2022    Procedure: INGUINAL HERNIA REPAIR WITH MESH;  Surgeon: Edison Aj MD;  Location: [facility];  Service: General;  Laterality: Left;    KNEE SURGERY Right     \"staph infection\"       Family History:   Family History   Problem Relation Age of Onset    No Known Problems Mother     No Known Problems Father     Depression Sister     Diabetes Maternal Aunt     Cancer Maternal Grandfather         LUNG       Social History:   Social History     Socioeconomic History    Marital status: Legally    Tobacco Use    Smoking status: Never     Passive exposure: Never    Smokeless tobacco: Current     Types: Snuff   Vaping Use    Vaping Use: Former    Substances: Nicotine   Substance and Sexual Activity    Alcohol use: Not Currently    Drug use: Not Currently     Comment: sober since 3/18/22.  See audit history.    Sexual activity: Defer       Medications:   Current " Outpatient Medications   Medication Sig Dispense Refill    buprenorphine-naloxone (SUBOXONE) 8-2 MG per SL tablet DISSOLVE 2 TABLETS UNDER THE TONGUE DAILY      buPROPion SR (WELLBUTRIN SR) 150 MG 12 hr tablet TAKE 2 TABLET BY MOUTH EVERY MORNING AND 1 TABLET BY MOUTH EVERY EVENING 270 tablet 1    busPIRone (BUSPAR) 15 MG tablet Take 1 tablet by mouth 3 (Three) Times a Day. 270 tablet 1    mirtazapine (REMERON) 45 MG tablet Take 1 tablet by mouth Every Night. 90 tablet 1    Multiple Vitamins-Iron (multivitamin with iron) tablet tablet Take 1 tablet by mouth Daily. 90 each 3    naloxone (NARCAN) 4 MG/0.1ML nasal spray CALL 911. SPR CONTENTS OF ONE SPRAYER (0.1ML) INTO ONE NOSTRIL. REPEAT IN 2-3 MIN IF SYMPTOMS OF OPIOID EMERGENCY PERSIST, ALTERNATE NOSTRILS      QUEtiapine (SEROquel) 300 MG tablet Take 1 tablet by mouth 2 (Two) Times a Day. 180 tablet 1    Sofosbuvir-Velpatasvir 400-100 MG tablet Take 1 tablet by mouth Daily. For 12 weeks 30 tablet 2     No current facility-administered medications for this visit.        Allergies:   No Known Allergies    Objective     Physical Exam:  Vital Signs:   Vitals:    01/19/24 0907   BP: 124/78   BP Location: Right arm   Patient Position: Sitting   Cuff Size: Large Adult   Pulse: 93   Temp: 98.3 °F (36.8 °C)   TempSrc: Infrared   SpO2: 98%   Weight: 85.9 kg (189 lb 4.8 oz)   PainSc: 0-No pain     Body mass index is 27.94 kg/m².   BMI is >= 25 and <30. (Overweight) The following options were offered after discussion;: exercise counseling/recommendations and nutrition counseling/recommendations          Body mass index is 27.94 kg/m².      Physical Exam  Vitals and nursing note reviewed.   Constitutional:       Appearance: Normal appearance.   HENT:      Head: Normocephalic and atraumatic.   Neck:      Vascular: No carotid bruit.   Cardiovascular:      Rate and Rhythm: Normal rate and regular rhythm.      Heart sounds: Normal heart sounds. No murmur heard.  Pulmonary:       Effort: Pulmonary effort is normal.      Breath sounds: Normal breath sounds.   Abdominal:      General: Bowel sounds are normal.      Palpations: Abdomen is soft. There is no mass.      Tenderness: There is no abdominal tenderness.   Musculoskeletal:      Cervical back: Neck supple.      Right lower leg: No edema.      Left lower leg: No edema.        Legs:    Skin:     Coloration: Skin is not jaundiced or pale.      Findings: No erythema.   Neurological:      Mental Status: He is alert. Mental status is at baseline.   Psychiatric:         Mood and Affect: Mood normal.         Behavior: Behavior normal.         Procedures    POCT Results (if applicable):   Results for orders placed or performed in visit on 07/12/22   HCV FibroSURE    Specimen: Blood   Result Value Ref Range    Fibrosis Score 0.08 0.00 - 0.21    Fibrosis Stage Comment     Necroinflammat Activity Score 0.87 (H) 0.00 - 0.17    Necroinflammat Activity Grade A3-Severe activity     Alpha 2-Macroglobulins, Qn 144 110 - 276 mg/dL    Haptoglobin 86 17 - 317 mg/dL    Apolipoprotein A-1 154 101 - 178 mg/dL    Total Bilirubin 0.3 0.0 - 1.2 mg/dL    GGT 54 0 - 65 IU/L    ALT (SGPT) 327 (H) 0 - 55 IU/L    HCV Qual Interp Comment     Fibrosis Scoring: Comment     Necroinflamm Activity Scoring: Comment     Limitations: Comment     Comment Comment    Urine Drug Screen - Urine, Clean Catch    Specimen: Urine, Clean Catch   Result Value Ref Range    THC, Screen, Urine Negative Negative    Phencyclidine (PCP), Urine Negative Negative    Cocaine Screen, Urine Negative Negative    Methamphetamine, Ur Negative Negative    Opiate Screen Negative Negative    Amphetamine Screen, Urine Negative Negative    Benzodiazepine Screen, Urine Negative Negative    Tricyclic Antidepressants Screen Negative Negative    Methadone Screen, Urine Negative Negative    Barbiturates Screen, Urine Negative Negative    Oxycodone Screen, Urine Negative Negative    Propoxyphene Screen Negative  Negative    Buprenorphine, Screen, Urine Positive (A) Negative   Hepatitis A Antibody, Total    Specimen: Blood   Result Value Ref Range    Hep A Total Ab Positive (A) Negative   Hepatitis B Core Antibody, Total    Specimen: Blood   Result Value Ref Range    Hep B Core Total Ab Negative Negative   Hepatitis B Surface Antigen    Specimen: Blood   Result Value Ref Range    Hepatitis B Surface Ag Non-Reactive Non-Reactive   Hepatitis C RNA, Quantitative, PCR (graph)    Specimen: Blood   Result Value Ref Range    Hepatitis C Quantitation [id number] IU/mL    HCV log10 5.215 log10 IU/mL    Test Information Comment    Protime-INR    Specimen: Blood   Result Value Ref Range    Protime 12.6 11.4 - 14.4 Seconds    INR 0.96 0.84 - 1.13   Comprehensive Metabolic Panel    Specimen: Blood   Result Value Ref Range    Glucose 90 65 - 99 mg/dL    BUN 16 6 - 20 mg/dL    Creatinine 0.87 0.76 - 1.27 mg/dL    Sodium 138 136 - 145 mmol/L    Potassium 4.2 3.5 - 5.2 mmol/L    Chloride 101 98 - 107 mmol/L    CO2 25.6 22.0 - 29.0 mmol/L    Calcium 9.6 8.6 - 10.5 mg/dL    Total Protein 7.7 6.0 - 8.5 g/dL    Albumin 5.00 3.50 - 5.20 g/dL    ALT (SGPT) 282 (H) 1 - 41 U/L    AST (SGOT) 83 (H) 1 - 40 U/L    Alkaline Phosphatase 100 39 - 117 U/L    Total Bilirubin 0.3 0.0 - 1.2 mg/dL    Globulin 2.7 gm/dL    A/G Ratio 1.9 g/dL    BUN/Creatinine Ratio 18.4 7.0 - 25.0    Anion Gap 11.4 5.0 - 15.0 mmol/L    eGFR 118.3 >60.0 mL/min/1.73   Hepatitis C Genotype    Specimen: Blood   Result Value Ref Range    Please note Comment     Hepatitis C Genotype 1a    CBC Auto Differential    Specimen: Blood   Result Value Ref Range    WBC 6.36 3.40 - 10.80 10*3/mm3    RBC 5.03 4.14 - 5.80 10*6/mm3    Hemoglobin 15.6 13.0 - 17.7 g/dL    Hematocrit 44.4 37.5 - 51.0 %    MCV 88.3 79.0 - 97.0 fL    MCH 31.0 26.6 - 33.0 pg    MCHC 35.1 31.5 - 35.7 g/dL    RDW 12.4 12.3 - 15.4 %    RDW-SD 39.0 37.0 - 54.0 fl    MPV 10.5 6.0 - 12.0 fL    Platelets 301 140 - 450 10*3/mm3     Neutrophil % 48.6 42.7 - 76.0 %    Lymphocyte % 33.0 19.6 - 45.3 %    Monocyte % 8.2 5.0 - 12.0 %    Eosinophil % 9.3 (H) 0.3 - 6.2 %    Basophil % 0.9 0.0 - 1.5 %    Immature Grans % 0.0 0.0 - 0.5 %    Neutrophils, Absolute 3.09 1.70 - 7.00 10*3/mm3    Lymphocytes, Absolute 2.10 0.70 - 3.10 10*3/mm3    Monocytes, Absolute 0.52 0.10 - 0.90 10*3/mm3    Eosinophils, Absolute 0.59 (H) 0.00 - 0.40 10*3/mm3    Basophils, Absolute 0.06 0.00 - 0.20 10*3/mm3    Immature Grans, Absolute 0.00 0.00 - 0.05 10*3/mm3    nRBC 0.0 0.0 - 0.2 /100 WBC   HIV-1 / O / 2 Ag / Antibody 4th Generation    Specimen: Blood   Result Value Ref Range    HIV-1/ HIV-2 Non-Reactive Non-Reactive       Measures:   Advanced Care Planning:   Patient does not have an advance directive, declines further assistance.    Smoking Cessation:   Does not smoke      Assessment / Plan      Assessment/Plan:    Josh reviewed per PDMP report.  Prescribed buprenorphine last dispense 1/15/2024.  Suboxone.    Patient to sign for records.      Patient reports previous knee surgery due to staph infection at age 10.    Reports he has Neuropathy and previously took Neurontin.      1. Mood disorder  1-3  Referral to Psych.  Discussed with patient that I am concerned with him taking Suboxone, Remeron and Seroquel at the same time due to interaction.  Patient reports he has been stable with these medications.    I suggested he start taking half tablet of Remeron.  Discussed referral to Psych.  Patient agreeable.  Will refer.  Patient is interested in coming off of some of the medications eventually.    Discussed with patient that some of his reported fatigue could be due to his medications.  He is taking multiple sedating medications.  - Ambulatory Referral to Psychiatry    2. Depression, unspecified depression type  As above.    - Ambulatory Referral to Psychiatry    3. PTSD (post-traumatic stress disorder)  As above.    - Ambulatory Referral to Psychiatry    4. History of  hepatitis C  Previously treated for Hep C per patient report, but did not finish treatment or follow-up with his treating provider.  Reports he did have a period of drug use after treatment.  Will check CMP, Hep C AB, and Hep C quantitative.  Check Hep B surface antigen, and HIV antibody testing as well.  Referral to Gastroenterology.    - Hepatitis C Virus (HCV) RNA, Diagnosis; Future  - Comprehensive Metabolic Panel; Future  - Hepatitis B surface antigen; Future  - HIV-1 / O / 2 Ag / Antibody; Future  - Ambulatory Referral to Gastroenterology    5. Fatigue, unspecified type    Check CBC, CMP as above, TSH.    - CBC & Differential; Future  - TSH; Future    6. Screening for hypothyroidism  Check TSH.    - TSH; Future    7. Blood per rectum  Referral to Gastroenterology.  Check CBC as above.    - Ambulatory Referral to Gastroenterology    8. Screening for hyperlipidemia  Fasting Lipid panel.    - Lipid Panel; Future    9.  BMI 27.0-27.9,adult  Discussed importance of portion control and making good food choices.  Encouraged regular exercise with goal of 3-5 days a week at least 30 minutes at a time.  Check fasting lipid panel.    10. Encounter to establish care with new doctor    Part of this note may be an electronic transcription/translation of spoken language to printed text using the Dragon Dictation System.         Vaccine Counseling:      Follow Up:   Return in about 4 weeks (around 2/16/2024).      DO TIFFANY Coffey

## 2024-01-19 NOTE — PATIENT INSTRUCTIONS
Take medications as ordered.  Low fat, low salt diet.  Exercise as tolerated.  Keep appt with Psych when scheduled.

## 2024-01-20 LAB
HCV RNA SERPL NAA+PROBE-ACNC: NORMAL IU/ML
TEST INFORMATION: NORMAL

## 2024-01-21 PROBLEM — F19.91 HISTORY OF ILLICIT DRUG USE: Status: ACTIVE | Noted: 2024-01-21

## 2024-01-22 ENCOUNTER — TELEPHONE (OUTPATIENT)
Dept: FAMILY MEDICINE CLINIC | Facility: CLINIC | Age: 34
End: 2024-01-22
Payer: COMMERCIAL

## 2024-01-22 NOTE — TELEPHONE ENCOUNTER
Okay for the HUB to relay:    Let patient know recent labs reviewed, and WBC count was normal at 5.85.  Hemoglobin was normal at 15.6.  Hepatitis C quantitative was reported as HCV not detected.  No evidence of active HCV infection.  Hepatitis B surface antigen was reported as non-reactive.  CMP results were normal.  Cholesterol was 169.  LDL/bad cholesterol= 94.  HDL/good cholesterol= 58     HIV antibody testing was reported as non-reactive  TSH was normal at 2.040.

## 2024-02-16 ENCOUNTER — OFFICE VISIT (OUTPATIENT)
Dept: FAMILY MEDICINE CLINIC | Facility: CLINIC | Age: 34
End: 2024-02-16
Payer: COMMERCIAL

## 2024-02-16 VITALS
HEIGHT: 69 IN | OXYGEN SATURATION: 97 % | HEART RATE: 85 BPM | BODY MASS INDEX: 28.82 KG/M2 | TEMPERATURE: 98.4 F | SYSTOLIC BLOOD PRESSURE: 133 MMHG | DIASTOLIC BLOOD PRESSURE: 84 MMHG | WEIGHT: 194.6 LBS

## 2024-02-16 DIAGNOSIS — F41.9 ANXIETY: ICD-10-CM

## 2024-02-16 DIAGNOSIS — G89.29 CHRONIC PAIN OF RIGHT KNEE: ICD-10-CM

## 2024-02-16 DIAGNOSIS — F33.1 MODERATE EPISODE OF RECURRENT MAJOR DEPRESSIVE DISORDER: ICD-10-CM

## 2024-02-16 DIAGNOSIS — M25.561 CHRONIC PAIN OF RIGHT KNEE: ICD-10-CM

## 2024-02-16 DIAGNOSIS — H10.9 BACTERIAL CONJUNCTIVITIS OF RIGHT EYE: Primary | ICD-10-CM

## 2024-02-16 PROCEDURE — 99214 OFFICE O/P EST MOD 30 MIN: CPT | Performed by: FAMILY MEDICINE

## 2024-02-16 RX ORDER — QUETIAPINE FUMARATE 300 MG/1
300 TABLET, FILM COATED ORAL 2 TIMES DAILY
Qty: 180 TABLET | Refills: 0 | Status: SHIPPED | OUTPATIENT
Start: 2024-02-16

## 2024-02-16 RX ORDER — MOXIFLOXACIN 5 MG/ML
1 SOLUTION/ DROPS OPHTHALMIC 3 TIMES DAILY
Qty: 2 ML | Refills: 0 | Status: SHIPPED | OUTPATIENT
Start: 2024-02-16 | End: 2024-02-23

## 2024-02-16 RX ORDER — BUPROPION HYDROCHLORIDE 150 MG/1
TABLET, EXTENDED RELEASE ORAL
Qty: 270 TABLET | Refills: 0 | Status: SHIPPED | OUTPATIENT
Start: 2024-02-16

## 2024-02-20 ENCOUNTER — TELEPHONE (OUTPATIENT)
Dept: FAMILY MEDICINE CLINIC | Facility: CLINIC | Age: 34
End: 2024-02-20
Payer: COMMERCIAL

## 2024-02-20 NOTE — TELEPHONE ENCOUNTER
"     Caller: José Luis Byrd \"Natanael\"    Relationship: Self    Best call back number: [phone]    What form or medical record are you requesting: RELEASE THAT WAS SIGNED 2 VISITS AGO    Who is requesting this form or medical record from you: MED REC DEPT AT [facility]    How would you like to receive the form or medical records (pick-up, mail, fax): FAX  If fax, what is the fax number: [phone] MILAD FLETCHER      Timeframe paperwork needed: ASAP     Additional notes: PT STATES HE NEEDS THE LAST ANNAMARIA SENT TO [facility] IN [place], [place] AT FAX # [phone] ASAP SO DR MENON CAN HAVE HIS FULL MED RECORDS     "

## 2024-02-20 NOTE — TELEPHONE ENCOUNTER
Hub and front can relay    Tried to reach the to pt to relay the provider results message:    Clive Farrell, DO   Let patient know recent x-ray series right knee showed minimal degenerative change.  No acute bony abnormality.    Please let us know if there are and other questions or concerns

## 2024-02-21 NOTE — TELEPHONE ENCOUNTER
"Name: José Luis Byrd \"Natanael\"    Relationship: Self    Best Callback Number: [phone]     HUB PROVIDED THE RELAY MESSAGE FROM THE OFFICE   PATIENT HAS FURTHER QUESTIONS AND WOULD LIKE A CALL BACK AT THE FOLLOWING PHONE NUMBER [phone]    ADDITIONAL INFORMATION: PATIENT WANTS TO KNOW IF THAT IS BAD OR GOOD. IF IT'S BAD PLEASE CALL HIM AND LET HIM KNOW. IF IT'S GOOD HE DOES NOT NEED A CALL BACK  "

## 2024-02-28 ENCOUNTER — TELEPHONE (OUTPATIENT)
Dept: FAMILY MEDICINE CLINIC | Facility: CLINIC | Age: 34
End: 2024-02-28
Payer: COMMERCIAL

## 2024-02-28 NOTE — TELEPHONE ENCOUNTER
HUB TO READ    M FOR PT TO CALL [phone]      PT NEEDS TO FILL OUT ANOTHER FORM TO REQUEST RECORDS FROM CORRECT PLACE. WE CANNOT CHANGE THE PLACE ON ORIGINAL FORM TO [facility].

## 2024-02-28 NOTE — TELEPHONE ENCOUNTER
"Name: José Luis Byrd \"Natanael\"      Relationship: Self      Best Callback Number: [phone]       HUB PROVIDED THE RELAY MESSAGE FROM THE OFFICE      PATIENT: VOICED UNDERSTANDING AND HAS NO FURTHER QUESTIONS AT THIS TIME    "

## 2024-05-13 DIAGNOSIS — F41.9 ANXIETY: ICD-10-CM

## 2024-05-13 DIAGNOSIS — F33.1 MODERATE EPISODE OF RECURRENT MAJOR DEPRESSIVE DISORDER: ICD-10-CM

## 2024-05-13 NOTE — TELEPHONE ENCOUNTER
Rx Refill Note  Requested Prescriptions     Pending Prescriptions Disp Refills    QUEtiapine (SEROquel) 300 MG tablet [Pharmacy Med Name: QUETIAPINE FUMARATE 300 MG TAB] 180 tablet 0     Sig: TAKE 1 TABLET BY MOUTH TWICE A DAY      Last office visit with prescribing clinician: 2/16/2024   Last telemedicine visit with prescribing clinician: Visit date not found   Next office visit with prescribing clinician: Visit date not found                         Would you like a call back once the refill request has been completed: [] Yes [] No    If the office needs to give you a call back, can they leave a voicemail: [] Yes [] No    Sandra Espitia MA  05/13/24, 12:17 EDT

## 2024-05-17 RX ORDER — QUETIAPINE FUMARATE 300 MG/1
300 TABLET, FILM COATED ORAL 2 TIMES DAILY
Qty: 180 TABLET | Refills: 0 | Status: SHIPPED | OUTPATIENT
Start: 2024-05-17

## 2024-09-26 DIAGNOSIS — F41.9 ANXIETY: ICD-10-CM

## 2024-09-26 DIAGNOSIS — F33.1 MODERATE EPISODE OF RECURRENT MAJOR DEPRESSIVE DISORDER: ICD-10-CM

## 2024-09-26 NOTE — TELEPHONE ENCOUNTER
Rx Refill Note  Requested Prescriptions     Pending Prescriptions Disp Refills    QUEtiapine (SEROquel) 300 MG tablet [Pharmacy Med Name: QUETIAPINE FUMARATE 300 MG TAB] 180 tablet 0     Sig: TAKE 1 TABLET BY MOUTH 2 TIMES A DAY      Last office visit with prescribing clinician: 2/16/2024   Last telemedicine visit with prescribing clinician: Visit date not found   Next office visit with prescribing clinician: Visit date not found                         Would you like a call back once the refill request has been completed: [] Yes [] No    If the office needs to give you a call back, can they leave a voicemail: [] Yes [] No    Cuca Harris MA  09/26/24, 15:05 EDT

## 2024-09-27 RX ORDER — QUETIAPINE FUMARATE 300 MG/1
300 TABLET, FILM COATED ORAL 2 TIMES DAILY
Qty: 180 TABLET | Refills: 0 | Status: SHIPPED | OUTPATIENT
Start: 2024-09-27

## 2024-10-12 ENCOUNTER — HOSPITAL ENCOUNTER (EMERGENCY)
Facility: HOSPITAL | Age: 34
Discharge: PSYCHIATRIC OR SUBSTANCE USE FACILITY | End: 2024-10-12
Attending: EMERGENCY MEDICINE
Payer: MEDICAID

## 2024-10-12 VITALS
HEART RATE: 112 BPM | TEMPERATURE: 98.9 F | OXYGEN SATURATION: 93 % | HEIGHT: 69 IN | WEIGHT: 178 LBS | DIASTOLIC BLOOD PRESSURE: 92 MMHG | BODY MASS INDEX: 26.36 KG/M2 | RESPIRATION RATE: 20 BRPM | SYSTOLIC BLOOD PRESSURE: 159 MMHG

## 2024-10-12 DIAGNOSIS — R45.851 SUICIDAL IDEATION: Primary | ICD-10-CM

## 2024-10-12 LAB
ALBUMIN SERPL-MCNC: 4.7 G/DL (ref 3.5–5.2)
ALBUMIN/GLOB SERPL: 1.7 G/DL
ALP SERPL-CCNC: 64 U/L (ref 39–117)
ALT SERPL W P-5'-P-CCNC: 20 U/L (ref 1–41)
AMPHET+METHAMPHET UR QL: POSITIVE
AMPHETAMINES UR QL: POSITIVE
ANION GAP SERPL CALCULATED.3IONS-SCNC: 15.3 MMOL/L (ref 5–15)
APAP SERPL-MCNC: <5 MCG/ML (ref 0–30)
AST SERPL-CCNC: 38 U/L (ref 1–40)
BARBITURATES UR QL SCN: NEGATIVE
BASOPHILS # BLD AUTO: 0.01 10*3/MM3 (ref 0–0.2)
BASOPHILS NFR BLD AUTO: 0.2 % (ref 0–1.5)
BENZODIAZ UR QL SCN: NEGATIVE
BILIRUB SERPL-MCNC: 0.4 MG/DL (ref 0–1.2)
BUN SERPL-MCNC: 9 MG/DL (ref 6–20)
BUN/CREAT SERPL: 9.6 (ref 7–25)
BUPRENORPHINE SERPL-MCNC: POSITIVE NG/ML
CALCIUM SPEC-SCNC: 9.6 MG/DL (ref 8.6–10.5)
CANNABINOIDS SERPL QL: NEGATIVE
CHLORIDE SERPL-SCNC: 101 MMOL/L (ref 98–107)
CO2 SERPL-SCNC: 26.7 MMOL/L (ref 22–29)
COCAINE UR QL: NEGATIVE
CREAT SERPL-MCNC: 0.94 MG/DL (ref 0.76–1.27)
DEPRECATED RDW RBC AUTO: 38.7 FL (ref 37–54)
EGFRCR SERPLBLD CKD-EPI 2021: 109.8 ML/MIN/1.73
EOSINOPHIL # BLD AUTO: 0 10*3/MM3 (ref 0–0.4)
EOSINOPHIL NFR BLD AUTO: 0 % (ref 0.3–6.2)
ERYTHROCYTE [DISTWIDTH] IN BLOOD BY AUTOMATED COUNT: 12.1 % (ref 12.3–15.4)
ETHANOL BLD-MCNC: <10 MG/DL (ref 0–10)
FENTANYL UR-MCNC: NEGATIVE NG/ML
GLOBULIN UR ELPH-MCNC: 2.7 GM/DL
GLUCOSE SERPL-MCNC: 128 MG/DL (ref 65–99)
HCT VFR BLD AUTO: 38 % (ref 37.5–51)
HGB BLD-MCNC: 13.3 G/DL (ref 13–17.7)
HOLD SPECIMEN: NORMAL
IMM GRANULOCYTES # BLD AUTO: 0.01 10*3/MM3 (ref 0–0.05)
IMM GRANULOCYTES NFR BLD AUTO: 0.2 % (ref 0–0.5)
LYMPHOCYTES # BLD AUTO: 0.68 10*3/MM3 (ref 0.7–3.1)
LYMPHOCYTES NFR BLD AUTO: 10.5 % (ref 19.6–45.3)
MCH RBC QN AUTO: 30 PG (ref 26.6–33)
MCHC RBC AUTO-ENTMCNC: 35 G/DL (ref 31.5–35.7)
MCV RBC AUTO: 85.8 FL (ref 79–97)
METHADONE UR QL SCN: NEGATIVE
MONOCYTES # BLD AUTO: 0.35 10*3/MM3 (ref 0.1–0.9)
MONOCYTES NFR BLD AUTO: 5.4 % (ref 5–12)
NEUTROPHILS NFR BLD AUTO: 5.41 10*3/MM3 (ref 1.7–7)
NEUTROPHILS NFR BLD AUTO: 83.7 % (ref 42.7–76)
OPIATES UR QL: NEGATIVE
OXYCODONE UR QL SCN: NEGATIVE
PCP UR QL SCN: NEGATIVE
PLAT MORPH BLD: NORMAL
PLATELET # BLD AUTO: 267 10*3/MM3 (ref 140–450)
PMV BLD AUTO: 10.6 FL (ref 6–12)
POTASSIUM SERPL-SCNC: 3.6 MMOL/L (ref 3.5–5.2)
PROT SERPL-MCNC: 7.4 G/DL (ref 6–8.5)
RBC # BLD AUTO: 4.43 10*6/MM3 (ref 4.14–5.8)
RBC MORPH BLD: NORMAL
SALICYLATES SERPL-MCNC: <0.5 MG/DL
SODIUM SERPL-SCNC: 143 MMOL/L (ref 136–145)
TRICYCLICS UR QL SCN: NEGATIVE
WBC MORPH BLD: NORMAL
WBC NRBC COR # BLD AUTO: 6.46 10*3/MM3 (ref 3.4–10.8)
WHOLE BLOOD HOLD COAG: NORMAL
WHOLE BLOOD HOLD SPECIMEN: NORMAL

## 2024-10-12 PROCEDURE — 85007 BL SMEAR W/DIFF WBC COUNT: CPT | Performed by: EMERGENCY MEDICINE

## 2024-10-12 PROCEDURE — 80307 DRUG TEST PRSMV CHEM ANLYZR: CPT | Performed by: EMERGENCY MEDICINE

## 2024-10-12 PROCEDURE — 80179 DRUG ASSAY SALICYLATE: CPT | Performed by: EMERGENCY MEDICINE

## 2024-10-12 PROCEDURE — 80053 COMPREHEN METABOLIC PANEL: CPT | Performed by: EMERGENCY MEDICINE

## 2024-10-12 PROCEDURE — 80143 DRUG ASSAY ACETAMINOPHEN: CPT | Performed by: EMERGENCY MEDICINE

## 2024-10-12 PROCEDURE — 82077 ASSAY SPEC XCP UR&BREATH IA: CPT | Performed by: EMERGENCY MEDICINE

## 2024-10-12 PROCEDURE — 99285 EMERGENCY DEPT VISIT HI MDM: CPT

## 2024-10-12 PROCEDURE — 36415 COLL VENOUS BLD VENIPUNCTURE: CPT

## 2024-10-12 PROCEDURE — 85025 COMPLETE CBC W/AUTO DIFF WBC: CPT | Performed by: EMERGENCY MEDICINE

## 2024-10-12 NOTE — FSED PROVIDER NOTE
"Subjective  History of Present Illness:    The patient presents the emergency department with suicidal ideation and homicidal ideation.  The patient reports that he had had sobriety from methamphetamine but recently relapsed.  The patient reports thoughts of hurting himself.  Patient also reports that he wants to hurt those around him.  Patient reports that he self presented to [facility] and had an evaluation but was refused for admission for treatment of his psychiatric illness.      Nurses Notes reviewed and agree, including vitals, allergies, social history and prior medical history.     REVIEW OF SYSTEMS:   Review of Systems   Psychiatric/Behavioral:  Positive for behavioral problems and suicidal ideas. Negative for hallucinations.        Past Medical History:   Diagnosis Date    ADHD (attention deficit hyperactivity disorder) 14 yrs of age    Anxiety     Bipolar 1 disorder     Depression     Heart murmur     Infectious viral hepatitis     HEP C    PTSD (post-traumatic stress disorder)     Sleep paralysis     Substance abuse     IV drugs and ETOH    Tattoo     TIA (transient ischemic attack) 2019    no residual       Allergies:    Patient has no known allergies.      Past Surgical History:   Procedure Laterality Date    HAND SURGERY Right     fracture surgery    INGUINAL HERNIA REPAIR Left 6/29/2022    Procedure: INGUINAL HERNIA REPAIR WITH MESH;  Surgeon: Edison Aj MD;  Location: [facility];  Service: General;  Laterality: Left;    KNEE SURGERY Right     \"staph infection\"         Social History     Socioeconomic History    Marital status: Legally    Tobacco Use    Smoking status: Never     Passive exposure: Never    Smokeless tobacco: Current     Types: Snuff   Vaping Use    Vaping status: Former    Substances: Nicotine   Substance and Sexual Activity    Alcohol use: Not Currently    Drug use: Not Currently     Comment: sober since 3/18/22.  See audit history.    Sexual activity: Not " "Currently     Partners: Female     Birth control/protection: None         Family History   Problem Relation Age of Onset    No Known Problems Mother     No Known Problems Father     Depression Sister     Alcohol abuse Sister     Anxiety disorder Sister     Diabetes Sister     Mental illness Sister     Diabetes Maternal Aunt     Cancer Maternal Grandfather         LUNG       Objective  Physical Exam:  /93   Pulse (!) 127   Temp 98.9 °F (37.2 °C) (Oral)   Resp 18   Ht 175.3 cm (69.02\")   Wt 80.7 kg (178 lb)   SpO2 97%   BMI 26.27 kg/m²      Physical Exam  Vitals and nursing note reviewed.   Constitutional:       Appearance: Normal appearance.   HENT:      Right Ear: External ear normal.      Left Ear: External ear normal.      Nose: Nose normal.      Mouth/Throat:      Mouth: Mucous membranes are moist.   Eyes:      Extraocular Movements: Extraocular movements intact.   Cardiovascular:      Rate and Rhythm: Normal rate and regular rhythm.   Pulmonary:      Effort: Pulmonary effort is normal.      Breath sounds: Normal breath sounds.   Musculoskeletal:         General: Normal range of motion.   Skin:     General: Skin is warm and dry.   Neurological:      General: No focal deficit present.      Mental Status: He is alert.   Psychiatric:         Mood and Affect: Mood normal. Affect is labile.         Speech: Speech is rapid and pressured and tangential.         Behavior: Behavior normal.         Procedures    ED Course:         Lab Results (last 24 hours)       Procedure Component Value Units Date/Time    Comprehensive Metabolic Panel [[id number]]  (Abnormal) Collected: 10/12/24 0317    Specimen: Blood Updated: 10/12/24 0357     Glucose 128 mg/dL      BUN 9 mg/dL      Creatinine 0.94 mg/dL      Sodium 143 mmol/L      Potassium 3.6 mmol/L      Comment: Specimen hemolyzed.  Result may be falsely elevated.        Chloride 101 mmol/L      CO2 26.7 mmol/L      Calcium 9.6 mg/dL      Total Protein 7.4 g/dL      " Albumin 4.7 g/dL      ALT (SGPT) 20 U/L      AST (SGOT) 38 U/L      Alkaline Phosphatase 64 U/L      Total Bilirubin 0.4 mg/dL      Globulin 2.7 gm/dL      A/G Ratio 1.7 g/dL      BUN/Creatinine Ratio 9.6     Anion Gap 15.3 mmol/L      eGFR 109.8 mL/min/1.73     Narrative:      GFR Normal >60  Chronic Kidney Disease <60  Kidney Failure <15      Acetaminophen Level [[id number]]  (Normal) Collected: 10/12/24 0317    Specimen: Blood Updated: 10/12/24 0339     Acetaminophen <5.0 mcg/mL     Ethanol [[id number]]  (Normal) Collected: 10/12/24 0317    Specimen: Blood Updated: 10/12/24 0340     Ethanol <10 mg/dL     Narrative:      Elevated lactic acid concentration and lactate dehydrogenase(LD) activity may falsely elevate enzymatically determined ethanol levels. Not for legal purposes.     Salicylate Level [[id number]]  (Normal) Collected: 10/12/24 0317    Specimen: Blood Updated: 10/12/24 0341     Salicylate <0.5 mg/dL     CBC & Differential [[id number]]  (Abnormal) Collected: 10/12/24 0338    Specimen: Blood Updated: 10/12/24 0346    Narrative:      The following orders were created for panel order CBC & Differential.  Procedure                               Abnormality         Status                     ---------                               -----------         ------                     CBC Auto Differential[[id number]]        Abnormal            Final result               Scan Slide[[id number]]                   Normal              Final result                 Please view results for these tests on the individual orders.    CBC Auto Differential [[id number]]  (Abnormal) Collected: 10/12/24 0338    Specimen: Blood Updated: 10/12/24 0346     WBC 6.46 10*3/mm3      RBC 4.43 10*6/mm3      Hemoglobin 13.3 g/dL      Hematocrit 38.0 %      MCV 85.8 fL      MCH 30.0 pg      MCHC 35.0 g/dL      RDW 12.1 %      RDW-SD 38.7 fl      MPV 10.6 fL      Platelets 267 10*3/mm3      Neutrophil % 83.7 %      Lymphocyte % 10.5 %      Monocyte  % 5.4 %      Eosinophil % 0.0 %      Basophil % 0.2 %      Immature Grans % 0.2 %      Neutrophils, Absolute 5.41 10*3/mm3      Lymphocytes, Absolute 0.68 10*3/mm3      Monocytes, Absolute 0.35 10*3/mm3      Eosinophils, Absolute 0.00 10*3/mm3      Basophils, Absolute 0.01 10*3/mm3      Immature Grans, Absolute 0.01 10*3/mm3     Scan Slide [[id number]]  (Normal) Collected: 10/12/24 0338    Specimen: Blood Updated: 10/12/24 0346     RBC Morphology Normal     WBC Morphology Normal     Platelet Morphology Normal    Narrative:      Not indicated    Urine Drug Screen - Urine, Clean Catch [[id number]]  (Abnormal) Collected: 10/12/24 0451    Specimen: Urine, Clean Catch Updated: 10/12/24 0508     THC, Screen, Urine Negative     Phencyclidine (PCP), Urine Negative     Cocaine Screen, Urine Negative     Methamphetamine, Ur Positive     Opiate Screen Negative     Amphetamine Screen, Urine Positive     Benzodiazepine Screen, Urine Negative     Tricyclic Antidepressants Screen Negative     Methadone Screen, Urine Negative     Barbiturates Screen, Urine Negative     Oxycodone Screen, Urine Negative     Buprenorphine, Screen, Urine Positive    Narrative:      Cutoff For Drugs Screened:    Amphetamines               500 ng/ml  Barbiturates               200 ng/ml  Benzodiazepines            150 ng/ml  Cocaine                    150 ng/ml  Methadone                  200 ng/ml  Opiates                    100 ng/ml  Phencyclidine               25 ng/ml  THC                         50 ng/ml  Methamphetamine            500 ng/ml  Tricyclic Antidepressants  300 ng/ml  Oxycodone                  100 ng/ml  Buprenorphine               10 ng/ml    The normal value for all drugs tested is negative. This report includes unconfirmed screening results, with the cutoff values listed, to be used for medical treatment purposes only.  Unconfirmed results must not be used for non-medical purposes such as employment or legal testing.  Clinical  consideration should be applied to any drug of abuse test, particularly when unconfirmed results are used.      Fentanyl, Urine - Urine, Clean Catch [[id number]]  (Normal) Collected: 10/12/24 0451    Specimen: Urine, Clean Catch Updated: 10/12/24 0517     Fentanyl, Urine Negative    Narrative:      Negative Threshold:      Fentanyl 5 ng/mL     The normal value for the drug tested is negative. This report includes final unconfirmed screening results to be used for medical treatment purposes only. Unconfirmed results must not be used for non-medical purposes such as employment or legal testing. Clinical consideration should be applied to any drug of abuse test, particularly when unconfirmed results are used.                    No radiology results from the last 24 hrs       MDM  Number of Diagnoses or Management Options  Suicidal ideation  Diagnosis management comments: I took over care of the patient.  Patient was evaluated by behavioral health determined to need admission however patient is involuntary at this point in 202a the patient was filed and signed by the  and patient was transported to [facility] via 's department for further evaluation by [facility].       Amount and/or Complexity of Data Reviewed  Clinical lab tests: reviewed        Initial impression of presenting illness: Suicidal ideation    DDX: includes but is not limited to: Unhoused, malingering, anxiety    Patient arrives by EMS with vitals interpreted by myself.     Pertinent features from physical exam: Tangential speech.    Initial diagnostic plan: Labs and psychiatric evaluation    Results from initial plan were reviewed and interpreted by me revealing nonactionable      Interventions / Re-evaluation:     Medications - No data to display    Results/clinical rationale were discussed with patient    Consultations/Discussion of results with other physicians:     Data interpreted: Nursing notes reviewed, vital signs reviewed.     reviewed independently interpreted by me.  EKG independently interpreted by me.  O2 saturation:    Care significantly affected by Social Determinants of Health (housing and economic circumstances, unemployment)               [] Yes     [x] No              If yes, Patient's care significantly limited by  Social Determinants of Health including:              [] Inadequate housing              [] Low income              [] Alcoholism and drug addiction in family              [] Problems related to primary support group              [] Unemployment              [] Problems related to employment              [] Other Social Determinants of Health:     Counseling: Discussed the results above with the patient regarding need for .  Patient understands and agrees plan of care.      -----  ED Disposition       ED Disposition   DC/Transfer to Behavioral Health    Condition   Stable    Comment   --             Final diagnoses:   Suicidal ideation     Your Follow-Up Providers    Follow-up information has not been specified.       Contact information for after-discharge care    Follow-up information has not been specified.          Your medication list        CONTINUE taking these medications        Instructions Last Dose Given Next Dose Due   buprenorphine-naloxone 8-2 MG per SL tablet  Commonly known as: SUBOXONE      DISSOLVE 2 TABLETS UNDER THE TONGUE DAILY       buPROPion  MG 12 hr tablet  Commonly known as: WELLBUTRIN SR      TAKE 2 TABLET BY MOUTH EVERY MORNING AND 1 TABLET BY MOUTH EVERY EVENING       busPIRone 15 MG tablet  Commonly known as: BUSPAR      Take 1 tablet by mouth 3 (Three) Times a Day.       mirtazapine 45 MG tablet  Commonly known as: REMERON      Take 1 tablet by mouth Every Night.       multivitamin with iron tablet tablet      Take 1 tablet by mouth Daily.       naloxone 4 MG/0.1ML nasal spray  Commonly known as: NARCAN      CALL 911. SPR CONTENTS OF ONE SPRAYER (0.1ML) INTO ONE NOSTRIL. REPEAT IN  2-3 MIN IF SYMPTOMS OF OPIOID EMERGENCY PERSIST, ALTERNATE NOSTRILS       QUEtiapine 300 MG tablet  Commonly known as: SEROquel      TAKE 1 TABLET BY MOUTH 2 TIMES A DAY
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
## 2024-10-21 RX ORDER — MIRTAZAPINE 7.5 MG/1
TABLET, FILM COATED ORAL
Refills: 0 | OUTPATIENT
Start: 2024-10-21

## 2024-10-21 RX ORDER — MULTIVIT-MIN/IRON FUM/FOLIC AC 7.5 MG-4
1 TABLET ORAL DAILY
Qty: 30 TABLET | Refills: 4 | OUTPATIENT
Start: 2024-10-21

## 2025-02-13 ENCOUNTER — OFFICE VISIT (OUTPATIENT)
Dept: FAMILY MEDICINE CLINIC | Facility: CLINIC | Age: 35
End: 2025-02-13
Payer: COMMERCIAL

## 2025-02-13 ENCOUNTER — LAB (OUTPATIENT)
Dept: LAB | Facility: HOSPITAL | Age: 35
End: 2025-02-13
Payer: COMMERCIAL

## 2025-02-13 ENCOUNTER — TELEPHONE (OUTPATIENT)
Dept: FAMILY MEDICINE CLINIC | Facility: CLINIC | Age: 35
End: 2025-02-13

## 2025-02-13 ENCOUNTER — PRIOR AUTHORIZATION (OUTPATIENT)
Dept: FAMILY MEDICINE CLINIC | Facility: CLINIC | Age: 35
End: 2025-02-13

## 2025-02-13 VITALS
WEIGHT: 175.6 LBS | SYSTOLIC BLOOD PRESSURE: 122 MMHG | BODY MASS INDEX: 26.01 KG/M2 | DIASTOLIC BLOOD PRESSURE: 88 MMHG | HEIGHT: 69 IN | OXYGEN SATURATION: 100 % | HEART RATE: 71 BPM

## 2025-02-13 DIAGNOSIS — Z13.220 SCREENING FOR LIPID DISORDERS: ICD-10-CM

## 2025-02-13 DIAGNOSIS — Z51.81 THERAPEUTIC DRUG MONITORING: ICD-10-CM

## 2025-02-13 DIAGNOSIS — Z76.89 ENCOUNTER TO ESTABLISH CARE: Primary | ICD-10-CM

## 2025-02-13 DIAGNOSIS — R53.83 OTHER FATIGUE: ICD-10-CM

## 2025-02-13 DIAGNOSIS — F33.1 MODERATE EPISODE OF RECURRENT MAJOR DEPRESSIVE DISORDER: ICD-10-CM

## 2025-02-13 DIAGNOSIS — F20.9 SCHIZOPHRENIA, UNSPECIFIED TYPE: ICD-10-CM

## 2025-02-13 DIAGNOSIS — G58.9 MONONEUROPATHY: ICD-10-CM

## 2025-02-13 DIAGNOSIS — F60.3 EXPLOSIVE PERSONALITY DISORDER: ICD-10-CM

## 2025-02-13 DIAGNOSIS — Z86.19 HISTORY OF HEPATITIS C: ICD-10-CM

## 2025-02-13 DIAGNOSIS — Z13.0 SCREENING FOR DEFICIENCY ANEMIA: ICD-10-CM

## 2025-02-13 DIAGNOSIS — Z11.59 ENCOUNTER FOR HCV SCREENING TEST FOR LOW RISK PATIENT: ICD-10-CM

## 2025-02-13 DIAGNOSIS — Z76.89 ENCOUNTER TO ESTABLISH CARE: ICD-10-CM

## 2025-02-13 DIAGNOSIS — Z11.4 SCREENING FOR HIV (HUMAN IMMUNODEFICIENCY VIRUS): ICD-10-CM

## 2025-02-13 DIAGNOSIS — F41.9 ANXIETY: ICD-10-CM

## 2025-02-13 LAB
BASOPHILS # BLD AUTO: 0.06 10*3/MM3 (ref 0–0.2)
BASOPHILS NFR BLD AUTO: 1 % (ref 0–1.5)
DEPRECATED RDW RBC AUTO: 42.7 FL (ref 37–54)
EOSINOPHIL # BLD AUTO: 0.39 10*3/MM3 (ref 0–0.4)
EOSINOPHIL NFR BLD AUTO: 6.6 % (ref 0.3–6.2)
ERYTHROCYTE [DISTWIDTH] IN BLOOD BY AUTOMATED COUNT: 13.3 % (ref 12.3–15.4)
HBA1C MFR BLD: 5.5 % (ref 4.8–5.6)
HCT VFR BLD AUTO: 42 % (ref 37.5–51)
HGB BLD-MCNC: 14.6 G/DL (ref 13–17.7)
IMM GRANULOCYTES # BLD AUTO: 0.01 10*3/MM3 (ref 0–0.05)
IMM GRANULOCYTES NFR BLD AUTO: 0.2 % (ref 0–0.5)
LYMPHOCYTES # BLD AUTO: 2.24 10*3/MM3 (ref 0.7–3.1)
LYMPHOCYTES NFR BLD AUTO: 37.8 % (ref 19.6–45.3)
MCH RBC QN AUTO: 30.8 PG (ref 26.6–33)
MCHC RBC AUTO-ENTMCNC: 34.8 G/DL (ref 31.5–35.7)
MCV RBC AUTO: 88.6 FL (ref 79–97)
MONOCYTES # BLD AUTO: 0.55 10*3/MM3 (ref 0.1–0.9)
MONOCYTES NFR BLD AUTO: 9.3 % (ref 5–12)
NEUTROPHILS NFR BLD AUTO: 2.67 10*3/MM3 (ref 1.7–7)
NEUTROPHILS NFR BLD AUTO: 45.1 % (ref 42.7–76)
NRBC BLD AUTO-RTO: 0 /100 WBC (ref 0–0.2)
PLATELET # BLD AUTO: 314 10*3/MM3 (ref 140–450)
PMV BLD AUTO: 11.2 FL (ref 6–12)
RBC # BLD AUTO: 4.74 10*6/MM3 (ref 4.14–5.8)
WBC NRBC COR # BLD AUTO: 5.92 10*3/MM3 (ref 3.4–10.8)

## 2025-02-13 PROCEDURE — 80053 COMPREHEN METABOLIC PANEL: CPT

## 2025-02-13 PROCEDURE — 87522 HEPATITIS C REVRS TRNSCRPJ: CPT

## 2025-02-13 PROCEDURE — 83036 HEMOGLOBIN GLYCOSYLATED A1C: CPT

## 2025-02-13 PROCEDURE — 86706 HEP B SURFACE ANTIBODY: CPT

## 2025-02-13 PROCEDURE — 84443 ASSAY THYROID STIM HORMONE: CPT

## 2025-02-13 PROCEDURE — 86704 HEP B CORE ANTIBODY TOTAL: CPT

## 2025-02-13 PROCEDURE — 86803 HEPATITIS C AB TEST: CPT

## 2025-02-13 PROCEDURE — 36415 COLL VENOUS BLD VENIPUNCTURE: CPT

## 2025-02-13 PROCEDURE — 82607 VITAMIN B-12: CPT

## 2025-02-13 PROCEDURE — 80061 LIPID PANEL: CPT

## 2025-02-13 PROCEDURE — 87340 HEPATITIS B SURFACE AG IA: CPT

## 2025-02-13 PROCEDURE — 99214 OFFICE O/P EST MOD 30 MIN: CPT | Performed by: STUDENT IN AN ORGANIZED HEALTH CARE EDUCATION/TRAINING PROGRAM

## 2025-02-13 PROCEDURE — 85025 COMPLETE CBC W/AUTO DIFF WBC: CPT

## 2025-02-13 PROCEDURE — G0432 EIA HIV-1/HIV-2 SCREEN: HCPCS

## 2025-02-13 PROCEDURE — 1126F AMNT PAIN NOTED NONE PRSNT: CPT | Performed by: STUDENT IN AN ORGANIZED HEALTH CARE EDUCATION/TRAINING PROGRAM

## 2025-02-13 RX ORDER — BUPROPION HYDROCHLORIDE 150 MG/1
TABLET, FILM COATED, EXTENDED RELEASE ORAL
Qty: 90 EACH | Refills: 3 | Status: SHIPPED | OUTPATIENT
Start: 2025-02-13

## 2025-02-13 RX ORDER — QUETIAPINE FUMARATE 300 MG/1
300 TABLET, FILM COATED ORAL 2 TIMES DAILY
Qty: 60 TABLET | Refills: 3 | Status: SHIPPED | OUTPATIENT
Start: 2025-02-13 | End: 2025-02-13

## 2025-02-13 RX ORDER — QUETIAPINE FUMARATE 300 MG/1
300 TABLET, FILM COATED ORAL 2 TIMES DAILY
Qty: 60 TABLET | Refills: 3 | Status: SHIPPED | OUTPATIENT
Start: 2025-02-13

## 2025-02-13 RX ORDER — BUPROPION HYDROCHLORIDE 150 MG/1
TABLET, FILM COATED, EXTENDED RELEASE ORAL
Qty: 90 EACH | Refills: 3 | Status: SHIPPED | OUTPATIENT
Start: 2025-02-13 | End: 2025-02-13

## 2025-02-13 RX ORDER — GABAPENTIN 100 MG/1
100 CAPSULE ORAL 3 TIMES DAILY
Qty: 90 CAPSULE | Refills: 0 | Status: SHIPPED | OUTPATIENT
Start: 2025-02-13

## 2025-02-13 RX ORDER — MULTIVIT/IRON SULF/FOLIC ACID 15MG-0.4MG
1 TABLET ORAL DAILY
Qty: 90 EACH | Refills: 3 | Status: SHIPPED | OUTPATIENT
Start: 2025-02-13

## 2025-02-13 NOTE — PATIENT INSTRUCTIONS
SUBOXONE CLINICS:    -[facility]  -[facility]  -[facility]  -[facility]  -[facility]  -[facility]

## 2025-02-13 NOTE — PROGRESS NOTES
"    New Patient Office Visit      Date: 2025   Patient Name: José Luis Byrd  : 1990   MRN: [id number]     Chief Complaint:    Chief Complaint   Patient presents with    Establish Care       History of Present Illness: José Luis Byrd is a 35 y.o. male who is here today to establish care.      Subjective      HPI:  Pt presents today to establish care.  Recently moved to [place].  Staying in sober living facility.    Went to [facility] on 25 for medication refills. He had been taking Seroquel and Wellbutrin for management of mood symptoms.  Has been diagnosed with anxiety, depression, schizophrenia and explosive personality disorder.  States that he has had issues controlling his anger in the past.  Was on Adderall therapy years ago for ADD symptoms.  Brings paper copy of medical records from 2018 confirming diagnosis and medication at that time.  He does have history of substance use and has not recently been on controlled stimulant therapy.  States that he would like to restart therapy to help with focus.  Tells me that he does not want to \"have to take meth off the streets\" to help treat his condition so he needs medication prescribed for this.  He was recently on BuSpar and Remeron in addition to the Seroquel and Wellbutrin but felt this made him too sedated so he self discontinued.  States that his uncontrolled mood swings have gotten him into legal trouble in the past when he was incarcerated for period of time.    He did test positive for hepatitis C at one point but has never taken treatment for this.  Unsure if he has been screened for HIV or hepatitis B in the past.    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  Reports severe injury to the leg as a child.  Has been on gabapentin therapy for this in the past.  This is also documented from previous providers notes in his chart.  He would like to restart gabapentin therapy at low-dose.  He does have someone who is " "in charge of managing his medications and dispensing them at his sober living facility.    Review of Systems:   Negative/not pertinent unless otherwise noted above in HPI.     Past Medical History:   Past Medical History:   Diagnosis Date    ADHD (attention deficit hyperactivity disorder) 14 yrs of age    Anxiety     Bipolar 1 disorder     Depression     Heart murmur     Infectious viral hepatitis     HEP C    PTSD (post-traumatic stress disorder)     Sleep paralysis     Substance abuse     IV drugs and ETOH    Tattoo     TIA (transient ischemic attack) 2019    no residual       Past Surgical History:   Past Surgical History:   Procedure Laterality Date    HAND SURGERY Right     fracture surgery    INGUINAL HERNIA REPAIR Left 6/29/2022    Procedure: INGUINAL HERNIA REPAIR WITH MESH;  Surgeon: Edison Aj MD;  Location: [facility];  Service: General;  Laterality: Left;    KNEE SURGERY Right     \"staph infection\"       Family History:   Family History   Problem Relation Age of Onset    No Known Problems Mother     No Known Problems Father     Depression Sister     Alcohol abuse Sister     Anxiety disorder Sister     Diabetes Sister     Mental illness Sister     Diabetes Maternal Aunt     Cancer Maternal Grandfather         LUNG       Social History:   Social History     Socioeconomic History    Marital status: Legally    Tobacco Use    Smoking status: Never     Passive exposure: Never    Smokeless tobacco: Current     Types: Snuff   Vaping Use    Vaping status: Former    Substances: Nicotine   Substance and Sexual Activity    Alcohol use: Not Currently    Drug use: Not Currently     Comment: sober since 3/18/22.  See audit history.    Sexual activity: Not Currently     Partners: Female     Birth control/protection: None       Medications:     Current Outpatient Medications:     buprenorphine-naloxone (SUBOXONE) 8-2 MG per SL tablet, DISSOLVE 2 TABLETS UNDER THE TONGUE DAILY, Disp: , Rfl:     buPROPion " "(ZYBAN) 150 MG 12 hr tablet, Take 300mg (2 tablets) in the morning and 150mg (one tablet) in the evening., Disp: 90 each, Rfl: 3    Multiple Vitamins-Iron (multivitamin with iron) tablet tablet, Take 1 tablet by mouth Daily., Disp: 90 each, Rfl: 3    QUEtiapine (SEROquel) 300 MG tablet, Take 1 tablet by mouth 2 (Two) Times a Day., Disp: 60 tablet, Rfl: 3    gabapentin (NEURONTIN) 100 MG capsule, Take 1 capsule by mouth 3 (Three) Times a Day., Disp: 90 capsule, Rfl: 0    Allergies:   No Known Allergies    Immunizations:  Immunization History   Administered Date(s) Administered    Tdap 04/22/2022       Tobacco Use: High Risk (2/13/2025)    Patient History     Smoking Tobacco Use: Never     Smokeless Tobacco Use: Current     Passive Exposure: Never       Social History     Substance and Sexual Activity   Alcohol Use Not Currently        Social History     Substance and Sexual Activity   Drug Use Not Currently    Comment: sober since 3/18/22.  See audit history.        PHQ-2 Depression Screening  Little interest or pleasure in doing things? Not at all   Feeling down, depressed, or hopeless? Not at all   PHQ-2 Total Score 0     PHQ-9 Total Score:      Objective     Physical Exam:  Vital Signs:   Vitals:    02/13/25 1317   BP: 122/88   BP Location: Left arm   Patient Position: Sitting   Cuff Size: Adult   Pulse: 71   SpO2: 100%   Weight: 79.7 kg (175 lb 9.6 oz)   Height: 175.3 cm (69.02\")     Body mass index is 25.92 kg/m².    Physical Exam  Vitals reviewed.   Constitutional:       General: He is not in acute distress.     Appearance: Normal appearance. He is normal weight.   HENT:      Head: Normocephalic and atraumatic.      Right Ear: Tympanic membrane normal.      Left Ear: Tympanic membrane normal.      Nose: Nose normal.      Mouth/Throat:      Mouth: Mucous membranes are moist.      Pharynx: Oropharynx is clear. No oropharyngeal exudate or posterior oropharyngeal erythema.   Eyes:      Extraocular Movements: " Extraocular movements intact.      Conjunctiva/sclera: Conjunctivae normal.      Pupils: Pupils are equal, round, and reactive to light.   Cardiovascular:      Rate and Rhythm: Normal rate and regular rhythm.      Heart sounds: Normal heart sounds. No murmur heard.  Pulmonary:      Effort: Pulmonary effort is normal.      Breath sounds: No wheezing.   Abdominal:      General: Abdomen is flat. Bowel sounds are normal.      Palpations: Abdomen is soft. There is no mass.      Tenderness: There is no abdominal tenderness.   Musculoskeletal:         General: Normal range of motion.      Cervical back: Normal range of motion and neck supple.   Lymphadenopathy:      Cervical: No cervical adenopathy.   Neurological:      General: No focal deficit present.      Mental Status: He is alert.   Psychiatric:         Mood and Affect: Mood normal.         Thought Content: Thought content normal.         Labs:   Hemoglobin A1C   Date Value Ref Range Status   02/13/2025 5.50 4.80 - 5.60 % Final     TSH   Date Value Ref Range Status   02/13/2025 2.560 0.270 - 4.200 uIU/mL Final          Assessment / Plan      Assessment/Plan:   Diagnoses and all orders for this visit:    1. Encounter to establish care (Primary)  -     Comprehensive Metabolic Panel; Future  -     CBC & Differential; Future  -     Lipid Panel With / Chol / HDL Ratio; Future  -     TSH Rfx On Abnormal To Free T4; Future  -     Hemoglobin A1c; Future  -     Hepatitis B Virus (HBV) Screening and Diagnosis; Future  -     HIV-1 / O / 2 Ag / Antibody; Future  -     HCV Antibody Rfx To Qnt PCR; Future    2. Moderate episode of recurrent major depressive disorder  3. Anxiety  -     buPROPion (ZYBAN) 150 MG 12 hr tablet; Take 300mg (2 tablets) in the morning and 150mg (one tablet) in the evening.  Dispense: 90 each; Refill: 3  -     QUEtiapine (SEROquel) 300 MG tablet; Take 1 tablet by mouth 2 (Two) Times a Day.  Dispense: 60 tablet; Refill: 3  -     Ambulatory Referral to  Behavioral Health      -     Ambulatory Referral to Behavioral Health    4. Explosive personality disorder  5. Schizophrenia, unspecified type  Reports history of explosive personality and schizophrenia diagnoses but these were given in the setting of active substance use.  Will defer to behavioral health.  May need reevaluation for appropriate diagnoses.  He does report that the bupropion and Seroquel are managing mood fairly well for the time being.  -     QUEtiapine (SEROquel) 300 MG tablet; Take 1 tablet by mouth 2 (Two) Times a Day.  Dispense: 60 tablet; Refill: 3  -     Ambulatory Referral to Behavioral Health    6. Mononeuropathy  There is previous documentation of neuropathy in both available charts on epic and printed medical record.  No imaging results are available for confirmation.  Discussed with patient that we will need to proceed with EMG study to confirm neuropathy.  In the meantime, I have agreed to restart low-dose gabapentin therapy.  CSA was signed today and UDS was obtained.  -     gabapentin (NEURONTIN) 100 MG capsule; Take 1 capsule by mouth 3 (Three) Times a Day.  Dispense: 90 capsule; Refill: 0  -     Vitamin B12; Future  -     EMG & Nerve Conduction Test; Future    7. Other fatigue  -     Vitamin B12; Future    8. Therapeutic drug monitoring  -     Compliance Drug Analysis, Ur - Urine, Clean Catch; Future    9. Screening for deficiency anemia  -     CBC & Differential; Future    10. Screening for lipid disorders  -     Lipid Panel With / Chol / HDL Ratio; Future    11. Screening for HIV (human immunodeficiency virus)  -     HIV-1 / O / 2 Ag / Antibody; Future    12. Encounter for HCV screening test for low risk patient  -     HCV Antibody Rfx To Qnt PCR; Future    13. History of hepatitis C  -     Hepatitis B Virus (HBV) Screening and Diagnosis; Future  -     HIV-1 / O / 2 Ag / Antibody; Future  -     HCV Antibody Rfx To Qnt PCR; Future    Other orders  -     Multiple Vitamins-Iron  (multivitamin with iron) tablet tablet; Take 1 tablet by mouth Daily.  Dispense: 90 each; Refill: 3    José Luis Byrd voices understanding and acceptance of this advice and will call back with any further questions or concerns. AVS with preventive healthcare tips printed for patient.         Follow Up:   Return in about 4 weeks (around 3/13/2025) for med check.        Nidia Knight DO  [facility] [address]

## 2025-02-13 NOTE — TELEPHONE ENCOUNTER
(Key: [id number]) - [id number]  buPROPion HCl ER 150MG er tablets  status: PA RequestCreated: February 13th, 2025 [id number]: February 13th, 2025

## 2025-02-13 NOTE — TELEPHONE ENCOUNTER
Caller: [facility] [id number] - [place] [id number] [address] AT [facility] - [phone] [facility] [phone]     Relationship to patient: Pharmacy      Best call back number: [phone]    Provider: DR IVORY    Medication PA needed: BUPROPION    Reason for call/Prior Auth: PHARMACY STATES THIS MEDICATION NEEDS A PRIOR AUTHORIZATION BECAUSE INSURANCE ALLOWS 2 TABS MAXIMUM. FOR 3 TABLETS DAILY IT NEEDS A PRIOR AUTHORIZATION

## 2025-02-14 ENCOUNTER — PRIOR AUTHORIZATION (OUTPATIENT)
Dept: FAMILY MEDICINE CLINIC | Facility: CLINIC | Age: 35
End: 2025-02-14
Payer: COMMERCIAL

## 2025-02-14 LAB
ALBUMIN SERPL-MCNC: 5.2 G/DL (ref 3.5–5.2)
ALBUMIN/GLOB SERPL: 1.7 G/DL
ALP SERPL-CCNC: 70 U/L (ref 39–117)
ALT SERPL W P-5'-P-CCNC: 18 U/L (ref 1–41)
ANION GAP SERPL CALCULATED.3IONS-SCNC: 14 MMOL/L (ref 5–15)
AST SERPL-CCNC: 26 U/L (ref 1–40)
BILIRUB SERPL-MCNC: 0.4 MG/DL (ref 0–1.2)
BUN SERPL-MCNC: 11 MG/DL (ref 6–20)
BUN/CREAT SERPL: 9.6 (ref 7–25)
CALCIUM SPEC-SCNC: 9.7 MG/DL (ref 8.6–10.5)
CHLORIDE SERPL-SCNC: 98 MMOL/L (ref 98–107)
CHOLEST SERPL-MCNC: 218 MG/DL (ref 0–200)
CO2 SERPL-SCNC: 24 MMOL/L (ref 22–29)
CREAT SERPL-MCNC: 1.15 MG/DL (ref 0.76–1.27)
EGFRCR SERPLBLD CKD-EPI 2021: 85.6 ML/MIN/1.73
GLOBULIN UR ELPH-MCNC: 3 GM/DL
GLUCOSE SERPL-MCNC: 75 MG/DL (ref 65–99)
HDLC SERPL QL: 3.16
HDLC SERPL-MCNC: 69 MG/DL (ref 40–60)
HIV 1+2 AB+HIV1 P24 AG SERPL QL IA: NORMAL
LDLC SERPL CALC-MCNC: 126 MG/DL (ref 0–100)
POTASSIUM SERPL-SCNC: 3.9 MMOL/L (ref 3.5–5.2)
PROT SERPL-MCNC: 8.2 G/DL (ref 6–8.5)
SODIUM SERPL-SCNC: 136 MMOL/L (ref 136–145)
TRIGL SERPL-MCNC: 134 MG/DL (ref 0–150)
TSH SERPL DL<=0.05 MIU/L-ACNC: 2.56 UIU/ML (ref 0.27–4.2)
VIT B12 BLD-MCNC: 472 PG/ML (ref 211–946)
VLDLC SERPL-MCNC: 23 MG/DL (ref 5–40)

## 2025-02-14 NOTE — TELEPHONE ENCOUNTER
(Key: [id number])    Drug  buPROPion HCl ER (Smoking Det) 150MG er tablets    Outcome  Additional Information Required  Member has an active PA on file which is expiring on 02/12/2026 and has 998 no. of fills remaining.

## 2025-02-15 LAB
HBV CORE AB SERPL QL IA: POSITIVE
HBV SURFACE AB SER QL: REACTIVE
HBV SURFACE AG SERPL QL IA: NEGATIVE
IMP & REVIEW OF LAB RESULTS: ABNORMAL
LABORATORY COMMENT REPORT: ABNORMAL

## 2025-02-17 PROBLEM — F60.3 EXPLOSIVE PERSONALITY DISORDER: Status: ACTIVE | Noted: 2025-02-17

## 2025-02-17 LAB
DIAGNOSTIC IMP SPEC-IMP: NORMAL
HCV IGG SERPL QL IA: REACTIVE
HCV RNA SERPL NAA+PROBE-ACNC: NORMAL IU/ML
REF LAB TEST REF RANGE: NORMAL

## 2025-02-20 ENCOUNTER — PATIENT ROUNDING (BHMG ONLY) (OUTPATIENT)
Dept: FAMILY MEDICINE CLINIC | Facility: CLINIC | Age: 35
End: 2025-02-20
Payer: COMMERCIAL

## 2025-02-20 LAB — DRUGS UR: NORMAL

## 2025-03-13 ENCOUNTER — OFFICE VISIT (OUTPATIENT)
Dept: FAMILY MEDICINE CLINIC | Facility: CLINIC | Age: 35
End: 2025-03-13
Payer: COMMERCIAL

## 2025-03-13 VITALS
BODY MASS INDEX: 25.48 KG/M2 | SYSTOLIC BLOOD PRESSURE: 122 MMHG | HEART RATE: 74 BPM | OXYGEN SATURATION: 97 % | WEIGHT: 172 LBS | DIASTOLIC BLOOD PRESSURE: 78 MMHG | HEIGHT: 69 IN

## 2025-03-13 DIAGNOSIS — F33.1 MODERATE EPISODE OF RECURRENT MAJOR DEPRESSIVE DISORDER: ICD-10-CM

## 2025-03-13 DIAGNOSIS — F20.9 SCHIZOPHRENIA, UNSPECIFIED TYPE: ICD-10-CM

## 2025-03-13 DIAGNOSIS — G58.9 MONONEUROPATHY: Primary | ICD-10-CM

## 2025-03-13 PROCEDURE — 99214 OFFICE O/P EST MOD 30 MIN: CPT | Performed by: STUDENT IN AN ORGANIZED HEALTH CARE EDUCATION/TRAINING PROGRAM

## 2025-03-13 PROCEDURE — 1126F AMNT PAIN NOTED NONE PRSNT: CPT | Performed by: STUDENT IN AN ORGANIZED HEALTH CARE EDUCATION/TRAINING PROGRAM

## 2025-03-13 RX ORDER — BUPRENORPHINE 300 MG/1
SOLUTION SUBCUTANEOUS
COMMUNITY
Start: 2025-02-21

## 2025-03-13 RX ORDER — GABAPENTIN 300 MG/1
300 CAPSULE ORAL 3 TIMES DAILY
Qty: 90 CAPSULE | Refills: 2 | Status: SHIPPED | OUTPATIENT
Start: 2025-03-13

## 2025-03-13 NOTE — PATIENT INSTRUCTIONS
"FAT  Limit total intake of fats and oils.  Avoid butter, stick margarine, shortening, lard, palm and coconut oils.  Limit mayonnaise, salad dressings, gravies and sauces, unless they are homemade with low-fat ingredients.  Limit chocolate.  Choose low-fat and nonfat products, such as low-fat mayonnaise, low-fat or non-hydrogenated peanut butter, low-fat or fat-free salad dressings and nonfat gravy.  Use vegetable oil, such as canola or olive oil.  Look for margarine that does not contain trans fatty acids.  Use nuts in moderate amounts.  Read ingredient labels carefully to determine both amount and type of fat present in foods. Limit saturated and trans fats.  Avoid high-fat processed and convenience foods.    MEATS AND ALTERNATIVES  Choose fish, chicken, turkey and lean meats.  Use dried beans, peas, lentils and tofu.  Limit egg yolks to three to four per week.  If you eat red meat, limit to no more than three servings per week and choose loin or round cuts.  Avoid fatty meats, such as escobar, sausage, campbell, luncheon meats and ribs.  Avoid all organ meats, including liver.    DAIRY  Choose nonfat or low-fat milk, yogurt and cottage cheese.  Most cheeses are high in fat. Choose cheeses made from non-fat milk, such as mozzarella and ricotta cheese.  Choose light or fat-free cream cheese and sour cream.  Avoid cream and sauces made with cream.  Fruits and Vegetables  Eat a wide variety of fruits and vegetables.  Use lemon juice, vinegar or \"mist\" olive oil on vegetables.  Avoid adding sauces, fat or oil to vegetables.  Breads, Cereals and Grains  Choose whole-grain breads, cereals, pastas and rice.  Avoid high-fat snack foods, such as granola, cookies, pies, pastries, doughnuts and croissants.    COOKING TIPS  Avoid deep fried foods.  Trim visible fat off meats and remove skin from poultry before cooking.  Bake, broil, boil, poach or roast poultry, fish and lean meats.  Drain and discard fat that drains out of meat " as you cook it.  Add little or no fat to foods.  Use vegetable oil sprays to grease pans for cooking or baking.  Steam vegetables.  Use herbs or no-oil marinades to flavor foods.

## 2025-03-14 NOTE — PROGRESS NOTES
Chief Complaint   Patient presents with    Mononeuropathy       HPI:  José Luis Byrd is a 35 y.o. male who presents today for FU on chronic conditions below.    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  Reports severe injury to the leg as a child. We restarted him at very low dose of 100mg TID. Hasn't really noticed any relief with this dose. Would like to trial increase. He does have someone who is in charge of managing his medications and dispensing them at his sober living facility.    Mood symptoms remain suboptimally controlled. Has diagnoses of explosive personality, depression, anxiety. Taking wellbutrin 450mg, Seroquel 300mg. Hasn't heard from behavioral health referral.     Recently switched from pills to sublocade shots. Doing well so far.      PE:  Vitals:    03/13/25 1339   BP: 122/78   Pulse: 74   SpO2: 97%      Body mass index is 25.39 kg/m².    Gen Appearance: NAD  HEENT: Normocephalic, EOMI, no thyromegaly, trachea midline  Heart: RRR, normal S1 and S2, no murmur  Lungs: CTA b/l, no wheezing, no crackles  MSK: Moves all extremities well, normal gait, no peripheral edema  Neuro: No focal deficits    Current Outpatient Medications   Medication Sig Dispense Refill    buPROPion (ZYBAN) 150 MG 12 hr tablet Take 300mg (2 tablets) in the morning and 150mg (one tablet) in the evening. 90 each 3    gabapentin (NEURONTIN) 300 MG capsule Take 1 capsule by mouth 3 (Three) Times a Day. 90 capsule 2    Multiple Vitamins-Iron (multivitamin with iron) tablet tablet Take 1 tablet by mouth Daily. 90 each 3    QUEtiapine (SEROquel) 300 MG tablet Take 1 tablet by mouth 2 (Two) Times a Day. 60 tablet 3    Sublocade 300 MG/1.5ML solution prefilled syringe        No current facility-administered medications for this visit.        A/P:  Diagnoses and all orders for this visit:    1. Mononeuropathy (Primary)  EMG scheduled for May w Neuro  PDMP  reviewed, appropriate. UDS and CSA on file.  Increase Gabapentin to 300mg TID    -     gabapentin (NEURONTIN) 300 MG capsule; Take 1 capsule by mouth 3 (Three) Times a Day.  Dispense: 90 capsule; Refill: 2    2. Moderate episode of recurrent major depressive disorder  3. Schizophrenia, unspecified type  Already has referral. Gave number to call and schedule.  For now continue Seroquel and Wellbutrin.        Return in about 3 months (around 6/13/2025).     Dictated Utilizing Dragon Dictation    Please note that portions of this note were completed with a voice recognition program.    Part of this note may be an electronic transcription/translation of spoken language to printed text using the Dragon Dictation System.

## 2025-04-21 ENCOUNTER — TELEPHONE (OUTPATIENT)
Dept: FAMILY MEDICINE CLINIC | Facility: CLINIC | Age: 35
End: 2025-04-21
Payer: COMMERCIAL

## 2025-04-21 NOTE — TELEPHONE ENCOUNTER
"Caller: José Luis Byrd \"Natanael\"    Relationship: Self    Best call back number: [phone]    Which medication are you concerned about: GABAPENTIN 300MG    Who prescribed you this medication: DR IVORY    When did you start taking this medication: 3 MONTHS    What are your concerns: PATIENT STATES THIS MEDICATION IS NOT HELPING ANYMORE AND WAS INSTRUCTED TO TELL PROVIDER IF THIS HAPPENS. PLEASE ADVISE      "

## 2025-04-22 NOTE — TELEPHONE ENCOUNTER
Unable to reach José Luis Byrd by phone or leave message.    Hub may relay message and document.    Nidia Knight, DO       4/21/25  4:41 PM  I would recommend an appointment to discuss med increase and potentially a referral to a specialist- he can do this over telehealth if that's easier

## 2025-05-06 NOTE — PROGRESS NOTES
No chief complaint on file.    Mode of Visit: My Chart Twilio Video  Location of patient: Home  Location of Provider: [facility]  Patient has chosen to receive care through a telehealth visit.  Does the patient consent to use a video/audio device for their medical care today: Yes  The visit included audio and video interaction: Yes    HPI:  José Luis Byrd is a 35 y.o. male with h/o polysubstance use, depression, anxiety, personality d/o and peripheral neuropath who presents today for neuropathy.    Had severe injury to right lower leg in childhood which resulted in nerve injury and neuropathic pain. Has been treated in past with Gabapentin which has been helpful. Restarted initially at 100mg TID and this was increased at last visit to 300mg TID. EMG is scheduled for 5/28.  Still feels as though symptoms are not well controlled on his current Gabapentin prescription. He has been taking three times daily as prescribed. Was previously up to doses of 800mg TID in years past. Has never been on Lyrica.    PE:  Unable to obtain vitals due to telehealth visit.     Gen Appearance: NAD  HEENT: Normocephalic, EOMI  Neuro: Alert, oriented, normal speech      Current Outpatient Medications   Medication Sig Dispense Refill    buPROPion (ZYBAN) 150 MG 12 hr tablet Take 300mg (2 tablets) in the morning and 150mg (one tablet) in the evening. 90 each 3    gabapentin (NEURONTIN) 300 MG capsule Take 1 capsule by mouth 3 (Three) Times a Day. 90 capsule 2    Multiple Vitamins-Iron (multivitamin with iron) tablet tablet Take 1 tablet by mouth Daily. 90 each 3    QUEtiapine (SEROquel) 300 MG tablet Take 1 tablet by mouth 2 (Two) Times a Day. 60 tablet 3    Sublocade 300 MG/1.5ML solution prefilled syringe        No current facility-administered medications for this visit.        A/P:  Assessment & Plan  Mononeuropathy  Symptoms remain uncontrolled on current dose of Gabapentin  Will trial switch to Lyrica  Start w 75mg BID x 2  weeks and plan to increase as needed to achieve symptom control  PDMP reviewed, CSA and UDS are on file  Orders:    pregabalin (Lyrica) 75 MG capsule; Take 1 capsule by mouth 2 (Two) Times a Day for 14 days.    Explosive personality disorder  Will cont current medication regimen  Referral to  for med management    Orders:    Ambulatory Referral to Behavioral Health    QUEtiapine (SEROquel) 300 MG tablet; Take 1 tablet by mouth 2 (Two) Times a Day.    buPROPion (ZYBAN) 150 MG 12 hr tablet; Take 300mg (2 tablets) in the morning and 150mg (one tablet) in the evening.    Anxiety  Moderate episode of recurrent major depressive disorder    Orders:    Ambulatory Referral to Behavioral Health    QUEtiapine (SEROquel) 300 MG tablet; Take 1 tablet by mouth 2 (Two) Times a Day.    buPROPion (ZYBAN) 150 MG 12 hr tablet; Take 300mg (2 tablets) in the morning and 150mg (one tablet) in the evening.        FU as scheduled in 1 month    Dictated Utilizing Dragon Dictation    Please note that portions of this note were completed with a voice recognition program.    Part of this note may be an electronic transcription/translation of spoken language to printed text using the Dragon Dictation System.

## 2025-05-07 ENCOUNTER — TELEMEDICINE (OUTPATIENT)
Dept: FAMILY MEDICINE CLINIC | Facility: CLINIC | Age: 35
End: 2025-05-07
Payer: COMMERCIAL

## 2025-05-07 ENCOUNTER — TELEPHONE (OUTPATIENT)
Dept: FAMILY MEDICINE CLINIC | Facility: CLINIC | Age: 35
End: 2025-05-07

## 2025-05-07 DIAGNOSIS — G58.9 MONONEUROPATHY: Primary | ICD-10-CM

## 2025-05-07 DIAGNOSIS — F41.9 ANXIETY: ICD-10-CM

## 2025-05-07 DIAGNOSIS — F60.3 EXPLOSIVE PERSONALITY DISORDER: ICD-10-CM

## 2025-05-07 DIAGNOSIS — F33.1 MODERATE EPISODE OF RECURRENT MAJOR DEPRESSIVE DISORDER: ICD-10-CM

## 2025-05-07 PROCEDURE — 99214 OFFICE O/P EST MOD 30 MIN: CPT | Performed by: STUDENT IN AN ORGANIZED HEALTH CARE EDUCATION/TRAINING PROGRAM

## 2025-05-07 PROCEDURE — 1126F AMNT PAIN NOTED NONE PRSNT: CPT | Performed by: STUDENT IN AN ORGANIZED HEALTH CARE EDUCATION/TRAINING PROGRAM

## 2025-05-07 RX ORDER — BUPROPION HYDROCHLORIDE 150 MG/1
TABLET, FILM COATED, EXTENDED RELEASE ORAL
Qty: 90 EACH | Refills: 3 | Status: SHIPPED | OUTPATIENT
Start: 2025-05-07

## 2025-05-07 RX ORDER — PREGABALIN 75 MG/1
75 CAPSULE ORAL 2 TIMES DAILY
Qty: 28 CAPSULE | Refills: 0 | Status: SHIPPED | OUTPATIENT
Start: 2025-05-07 | End: 2025-05-19 | Stop reason: SDUPTHER

## 2025-05-07 RX ORDER — QUETIAPINE FUMARATE 300 MG/1
300 TABLET, FILM COATED ORAL 2 TIMES DAILY
Qty: 60 TABLET | Refills: 5 | Status: SHIPPED | OUTPATIENT
Start: 2025-05-07

## 2025-05-07 NOTE — TELEPHONE ENCOUNTER
NO LONGER NEEDED.  PATIENT IS GOING TO SEE IF HE CAN HAVE [facility] SWITCH HIS MEDICATIONS TO ANOTHER [facility].  HE WILL CALL BACK IF THEY  CAN'T DO THIS.  HE REQUESTED NO MESSAGE TO BE SENT AT THIS TIME

## 2025-05-13 NOTE — ASSESSMENT & PLAN NOTE
Orders:    Ambulatory Referral to Behavioral Health    QUEtiapine (SEROquel) 300 MG tablet; Take 1 tablet by mouth 2 (Two) Times a Day.    buPROPion (ZYBAN) 150 MG 12 hr tablet; Take 300mg (2 tablets) in the morning and 150mg (one tablet) in the evening.

## 2025-05-13 NOTE — ASSESSMENT & PLAN NOTE
Will cont current medication regimen  Referral to  for med management    Orders:    Ambulatory Referral to Behavioral Health    QUEtiapine (SEROquel) 300 MG tablet; Take 1 tablet by mouth 2 (Two) Times a Day.    buPROPion (ZYBAN) 150 MG 12 hr tablet; Take 300mg (2 tablets) in the morning and 150mg (one tablet) in the evening.

## 2025-05-19 ENCOUNTER — PATIENT MESSAGE (OUTPATIENT)
Dept: FAMILY MEDICINE CLINIC | Facility: CLINIC | Age: 35
End: 2025-05-19
Payer: COMMERCIAL

## 2025-05-19 DIAGNOSIS — G58.9 MONONEUROPATHY: ICD-10-CM

## 2025-05-19 RX ORDER — PREGABALIN 100 MG/1
CAPSULE ORAL
Qty: 90 CAPSULE | Refills: 0 | Status: SHIPPED | OUTPATIENT
Start: 2025-05-19

## 2025-06-17 DIAGNOSIS — G58.9 MONONEUROPATHY: ICD-10-CM

## 2025-06-18 DIAGNOSIS — G58.9 MONONEUROPATHY: ICD-10-CM

## 2025-06-18 RX ORDER — PREGABALIN 150 MG/1
150 CAPSULE ORAL 3 TIMES DAILY
Qty: 90 CAPSULE | Refills: 0 | Status: SHIPPED | OUTPATIENT
Start: 2025-06-18

## 2025-06-18 NOTE — TELEPHONE ENCOUNTER
Rx Refill Note  Requested Prescriptions     Pending Prescriptions Disp Refills    pregabalin (LYRICA) 100 MG capsule [Pharmacy Med Name: PREGABALIN 100 MG CAPSULE] 90 capsule      Sig: TAKE 1 CAPSULE BY MOUTH 2 TIMES A DAY FOR 1 WEEK, THEN INCREASE TO TAKE 1 CAPSULE BY MOUTH 3 TIMES A DAY      Last office visit with prescribing clinician: 3/13/2025   Last telemedicine visit with prescribing clinician: 5/7/2025   Next office visit with prescribing clinician: 6/18/2025       Radha Juarez MA  06/18/25, 13:37 EDT

## 2025-06-19 RX ORDER — PREGABALIN 100 MG/1
CAPSULE ORAL
Qty: 90 CAPSULE | Refills: 0 | OUTPATIENT
Start: 2025-06-19

## 2025-07-18 DIAGNOSIS — G58.9 MONONEUROPATHY: ICD-10-CM

## 2025-07-19 DIAGNOSIS — F33.1 MODERATE EPISODE OF RECURRENT MAJOR DEPRESSIVE DISORDER: ICD-10-CM

## 2025-07-19 DIAGNOSIS — F60.3 EXPLOSIVE PERSONALITY DISORDER: ICD-10-CM

## 2025-07-19 RX ORDER — QUETIAPINE FUMARATE 300 MG/1
300 TABLET, FILM COATED ORAL 2 TIMES DAILY
Qty: 14 TABLET | Refills: 0 | Status: SHIPPED | OUTPATIENT
Start: 2025-07-19 | End: 2025-07-21 | Stop reason: SDUPTHER

## 2025-07-19 RX ORDER — BUPROPION HYDROCHLORIDE 150 MG/1
TABLET, FILM COATED, EXTENDED RELEASE ORAL
Qty: 21 EACH | Refills: 0 | Status: SHIPPED | OUTPATIENT
Start: 2025-07-19 | End: 2025-07-21 | Stop reason: SDUPTHER

## 2025-07-21 ENCOUNTER — TELEMEDICINE (OUTPATIENT)
Dept: FAMILY MEDICINE CLINIC | Facility: CLINIC | Age: 35
End: 2025-07-21
Payer: COMMERCIAL

## 2025-07-21 DIAGNOSIS — F33.1 MODERATE EPISODE OF RECURRENT MAJOR DEPRESSIVE DISORDER: ICD-10-CM

## 2025-07-21 DIAGNOSIS — G58.9 MONONEUROPATHY: ICD-10-CM

## 2025-07-21 DIAGNOSIS — F60.3 EXPLOSIVE PERSONALITY DISORDER: ICD-10-CM

## 2025-07-21 RX ORDER — QUETIAPINE FUMARATE 300 MG/1
300 TABLET, FILM COATED ORAL 2 TIMES DAILY
Qty: 60 TABLET | Refills: 2 | Status: SHIPPED | OUTPATIENT
Start: 2025-07-21

## 2025-07-21 RX ORDER — PREGABALIN 150 MG/1
150 CAPSULE ORAL 3 TIMES DAILY
Qty: 90 CAPSULE | OUTPATIENT
Start: 2025-07-21

## 2025-07-21 RX ORDER — BUPROPION HYDROCHLORIDE 150 MG/1
TABLET, FILM COATED, EXTENDED RELEASE ORAL
Qty: 90 EACH | Refills: 2 | Status: SHIPPED | OUTPATIENT
Start: 2025-07-21

## 2025-07-21 RX ORDER — PREGABALIN 150 MG/1
150 CAPSULE ORAL 3 TIMES DAILY
Qty: 90 CAPSULE | Refills: 2 | Status: SHIPPED | OUTPATIENT
Start: 2025-07-21

## 2025-07-21 NOTE — PROGRESS NOTES
Telehealth E-Visit      Patient Name: José Luis Byrd  : 1990   MRN: [id number]     Chief Complaint:  No chief complaint on file.      I have reviewed the E-Visit questionnaire and the patient's answers, my assessment and plan are listed below.     Patient was seen today through synchronous audio/video technology. Verbal consent was obtained. The patient was located at [facility]. Maryjo Dinh PA-C , was located at [facility] in [place], [place]. Vitals signs were not obtained due to lack of home monitoring access. Time spent with patient was 6 minutes.       History of Present Illness  The patient presents via virtual visit for medication refills.    Patient was previously followed by Dr. Knight.  He was last seen via telehealth on 2025.    He is currently residing at [address] [place] [address] in [place], a temporary arrangement until he secures accommodation in [place]. He has been in [place] for approximately a week, having left his previous residence due to a bedbug infestation. He plans to return to [place] once he finds a place with current treatment program. His current medication regimen includes bupropion 150 mg (2 tablets in the morning and 1 in the evening), Seroquel 300 mg (twice daily), Lyrica 150 mg (3 times daily), and a multivitamin.      Subjective      Review of Systems:   Review of Systems   Constitutional:  Negative for chills, fatigue and fever.   Respiratory:  Negative for shortness of breath.    Cardiovascular:  Negative for chest pain.   Gastrointestinal:  Negative for abdominal pain.   Neurological:  Negative for dizziness and numbness.       I have reviewed and the following portions of the patient's history were updated as appropriate: past family history, past medical history, past social history, past surgical history and problem list.    Medications:     Current Outpatient Medications:     buPROPion (ZYBAN) 150 MG 12 hr tablet, Take  300mg (2 tablets) in the morning and 150mg (one tablet) in the evening., Disp: 90 each, Rfl: 2    pregabalin (LYRICA) 150 MG capsule, Take 1 capsule by mouth 3 times a day., Disp: 90 capsule, Rfl: 2    QUEtiapine (SEROquel) 300 MG tablet, Take 1 tablet by mouth 2 (Two) Times a Day., Disp: 60 tablet, Rfl: 2    Multiple Vitamins-Iron (multivitamin with iron) tablet tablet, Take 1 tablet by mouth Daily., Disp: 90 each, Rfl: 3    Allergies:   No Known Allergies    Objective     Physical Exam:  Vital Signs: There were no vitals filed for this visit.         Physical Exam  Constitutional:       Appearance: Normal appearance.   HENT:      Head: Normocephalic and atraumatic.   Pulmonary:      Effort: Pulmonary effort is normal.   Neurological:      Mental Status: He is alert.   Psychiatric:         Mood and Affect: Mood normal.         Behavior: Behavior normal.         Thought Content: Thought content normal.           Assessment / Plan      Assessment/Plan:   Diagnoses and all orders for this visit:    1. Explosive personality disorder  -     buPROPion (ZYBAN) 150 MG 12 hr tablet; Take 300mg (2 tablets) in the morning and 150mg (one tablet) in the evening.  Dispense: 90 each; Refill: 2  -     QUEtiapine (SEROquel) 300 MG tablet; Take 1 tablet by mouth 2 (Two) Times a Day.  Dispense: 60 tablet; Refill: 2    2. Moderate episode of recurrent major depressive disorder  -     buPROPion (ZYBAN) 150 MG 12 hr tablet; Take 300mg (2 tablets) in the morning and 150mg (one tablet) in the evening.  Dispense: 90 each; Refill: 2  -     QUEtiapine (SEROquel) 300 MG tablet; Take 1 tablet by mouth 2 (Two) Times a Day.  Dispense: 60 tablet; Refill: 2    3. Mononeuropathy  -     pregabalin (LYRICA) 150 MG capsule; Take 1 capsule by mouth 3 times a day.  Dispense: 90 capsule; Refill: 2        Assessment & Plan  1. Medication management.  - Currently on bupropion 150 mg (2 tablets in the morning and 1 tablet in the evening), Seroquel 300 mg  twice daily, and Lyrica 150 mg three times a day. Additionally, takes a multivitamin.  -CSA is current as of 2/13/2025  -PDMP reviewed and is appropriate.  - Prescription refill for these medications has been sent to the pharmacy for a duration of 3 months.  - Advised to inform if there is a need to send the refills to a different pharmacy upon relocation.  - Follow-up recommended in person upon return to [place].      Follow Up:   No follow-ups on file.    Any medications prescribed have been sent electronically to   [facility] [id number] - [place], [place] - [facility] AT [facility] - [phone]  - [phone] FX  [address]  [facility] [address]  Phone: [phone] Fax: [phone]    [facility] - [place], [place] - [address] - [phone] PH - [phone] FX  [address]  [facility] [address]  Phone: [phone] Fax: [phone]    [facility] [id number] - [place], [place] - [address] - [phone] PH - [phone] FX  [address]  [facility] [address]  Phone: [phone] Fax: [phone]    [facility] - [place], [place] - [address] [facility] [address] - [phone] PH - [phone] FX  [address]  [facility] [address]  Phone: [phone] Fax: [phone]      10 minutes were spent reviewing the patient's questionnaire, formulating a treatment plan, and relaying information to the patient via [employer].    Patient or patient representative verbalized consent for the use of Ambient Listening during the visit with  Maryjo Dinh PA-C for chart documentation. 7/21/2025  16:09 EDT    Maryjo Dinh PA-C   [facility]  07/21/25  16:05 EDT

## 2025-07-21 NOTE — TELEPHONE ENCOUNTER
Rx Refill Note  Requested Prescriptions     Pending Prescriptions Disp Refills    pregabalin (LYRICA) 150 MG capsule [Pharmacy Med Name: PREGABALIN 150 MG CAPSULE] 90 capsule      Sig: TAKE 1 CAPSULE BY MOUTH 3 TIMES A DAY      Last office visit with prescribing clinician: 3/13/2025   Last telemedicine visit with prescribing clinician: 5/7/2025   Next office visit with prescribing clinician: Visit date not found                         Would you like a call back once the refill request has been completed: [] Yes [] No    If the office needs to give you a call back, can they leave a voicemail: [] Yes [] No    Veronica Fishman MA  07/21/25, 07:57 EDT

## 2025-07-22 RX ORDER — MULTIVIT/IRON SULF/FOLIC ACID 15MG-0.4MG
1 TABLET ORAL DAILY
Qty: 90 EACH | Refills: 0 | Status: SHIPPED | OUTPATIENT
Start: 2025-07-22

## 2025-07-22 NOTE — TELEPHONE ENCOUNTER
Rx Refill Note  Requested Prescriptions     Pending Prescriptions Disp Refills    Multiple Vitamins-Iron (multivitamin with iron) tablet tablet 90 each 3     Sig: Take 1 tablet by mouth Daily.      Last office visit with prescribing clinician: Visit date not found   Last telemedicine visit with prescribing clinician: 7/21/2025   Next office visit with prescribing clinician: Visit date not found                         Would you like a call back once the refill request has been completed: [] Yes [] No    If the office needs to give you a call back, can they leave a voicemail: [] Yes [] No    Veronica Fishman MA  07/22/25, 11:05 EDT

## (undated) DEVICE — RICH MAJOR PROCEDURE: Brand: MEDLINE INDUSTRIES, INC.

## (undated) DEVICE — SUT VIC 3/0 SH 27IN J416H

## (undated) DEVICE — SLV SCD CALF HEMOFORCE DVT THERP REPROC MD

## (undated) DEVICE — SUT VIC 3/0 TIES 18IN J110T

## (undated) DEVICE — ADHS LIQ MASTISOL 2/3ML

## (undated) DEVICE — GLV SURG SENSICARE W/ALOE PF LF 7.5 STRL

## (undated) DEVICE — UNDYED MONOFILAMENT (POLYDIOXANONE), ABSORBABLE SYNTHETIC SURGICAL SUTURE: Brand: PDS

## (undated) DEVICE — ADHS SKIN PREMIERPRO EXOFIN TOPICAL HI/VISC .5ML

## (undated) DEVICE — STRIP,CLOSURE,WOUND,MEDI-STRIP,1/2X4: Brand: MEDLINE

## (undated) DEVICE — DRSNG WND GZ PAD BORDERED LF 4X5IN STRL

## (undated) DEVICE — PENCL ES MEGADINE EZ/CLEAN BUTN W/HOLSTR 10FT

## (undated) DEVICE — NDL HYPO ECLPS SFTY 22G 1 1/2IN

## (undated) DEVICE — SUT VIC 2/0 SH 27IN

## (undated) DEVICE — SYR LUERLOK 30CC

## (undated) DEVICE — SUT VIC 4/0 SH 27IN J415H

## (undated) DEVICE — SUT PROLENE CLOSURE MO6 2/0 30IN 8417H

## (undated) DEVICE — DRN PENRS SIL 1/4X18IN LF STRL